# Patient Record
Sex: FEMALE | Race: WHITE | HISPANIC OR LATINO | Employment: PART TIME | URBAN - METROPOLITAN AREA
[De-identification: names, ages, dates, MRNs, and addresses within clinical notes are randomized per-mention and may not be internally consistent; named-entity substitution may affect disease eponyms.]

---

## 2017-02-15 ENCOUNTER — OFFICE VISIT (OUTPATIENT)
Dept: MEDICAL GROUP | Facility: PHYSICIAN GROUP | Age: 22
End: 2017-02-15
Payer: COMMERCIAL

## 2017-02-15 ENCOUNTER — TELEPHONE (OUTPATIENT)
Dept: MEDICAL GROUP | Facility: PHYSICIAN GROUP | Age: 22
End: 2017-02-15

## 2017-02-15 VITALS
TEMPERATURE: 97.5 F | SYSTOLIC BLOOD PRESSURE: 124 MMHG | DIASTOLIC BLOOD PRESSURE: 86 MMHG | RESPIRATION RATE: 18 BRPM | HEIGHT: 64 IN | BODY MASS INDEX: 30.77 KG/M2 | HEART RATE: 80 BPM | OXYGEN SATURATION: 95 % | WEIGHT: 180.2 LBS

## 2017-02-15 DIAGNOSIS — Z30.41 SURVEILLANCE FOR BIRTH CONTROL, ORAL CONTRACEPTIVES: ICD-10-CM

## 2017-02-15 DIAGNOSIS — Z30.41 ENCOUNTER FOR SURVEILLANCE OF CONTRACEPTIVE PILLS: ICD-10-CM

## 2017-02-15 PROCEDURE — 99213 OFFICE O/P EST LOW 20 MIN: CPT | Performed by: NURSE PRACTITIONER

## 2017-02-15 RX ORDER — NORGESTIMATE-ETHINYL ESTRADIOL 7DAYSX3 LO
1 TABLET ORAL DAILY
Qty: 84 TAB | Refills: 3 | Status: SHIPPED | OUTPATIENT
Start: 2017-02-15 | End: 2017-09-29

## 2017-02-15 ASSESSMENT — PATIENT HEALTH QUESTIONNAIRE - PHQ9: CLINICAL INTERPRETATION OF PHQ2 SCORE: 0

## 2017-02-15 NOTE — TELEPHONE ENCOUNTER
Pt seen today  Received fax from pharmacy regarding ortho tri-cyclen lo.  rx that was sent in indicates BRAVO but in notes it states generic requested.  Pharmacy is asking for verification.    [facility]  [phone]

## 2017-02-15 NOTE — PROGRESS NOTES
"Subjective:     Chief Complaint   Patient presents with   • Medication Refill     Birth Control- GENERIC        HPI:  Tabbatha Reyes is a 21 y.o. female here today to discuss the following:    Birth control  Patient here for refill on OCPs. She is currently sexually active and uses no other protection. LMP 2/9/2019 ending yesterday. Menses are regular with moderate to light flow She is nonsmoker and denies any leg pain. No history of blood clots. Blood pressures controlled at 124/86.        Current medicines (including changes today)  Current Outpatient Prescriptions   Medication Sig Dispense Refill   • ORTHO TRI-CYCLEN LO 0.18/0.215/0.25 MG-25 MCG Tab Take 1 Tab by mouth every day. 84 Tab 3     No current facility-administered medications for this visit.       She  has no past medical history on file.    ROS   Review of Systems   Constitutional: Negative for fever, chills, weight loss and malaise/fatigue.   HENT: Negative for ear pain, nosebleeds, congestion, sore throat and neck pain.    Respiratory: Negative for cough, sputum production, shortness of breath and wheezing.    Cardiovascular: Negative for chest pain, palpitations,  and leg swelling.   Gastrointestinal: Negative for heartburn, nausea, vomiting, diarrhea and abdominal pain.   Neurological: Negative for dizziness, tingling, tremors, sensory change, focal weakness and headaches.   Psychiatric/Behavioral: Negative for depression, anxiety, suicidal ideas, insomnia and memory loss.    All other systems reviewed and are negative except as in HPI.     Objective:   Physical Exam:  Blood pressure 124/86, pulse 80, temperature 36.4 °C (97.5 °F), resp. rate 18, height 1.613 m (5' 3.5\"), weight 81.738 kg (180 lb 3.2 oz), last menstrual period 02/14/2017, SpO2 95 %, not currently breastfeeding. Body mass index is 31.42 kg/(m^2).   General:  Well nourished, well developed in NAD  Head is grossly normal.  Neck: Supple without JVD   Pulmonary:  Normal " effort.  Cardiovascular: Regular rate   Extremities: no clubbing, cyanosis, or edema.   Assessment and Plan:   The following treatment plan was discussed   1. Surveillance for birth control, oral contraceptives  ORTHO TRI-CYCLEN LO 0.18/0.215/0.25 MG-25 MCG Tab   2. Encounter for surveillance of contraceptive pills         Followup: Return in about 1 year (around 2/15/2018) for AWV/HRA.   Please note that this dictation was created using voice recognition software. I have made every reasonable attempt to correct obvious errors, but I expect that there are errors of grammar and possibly content that I did not discover before finalizing the note.

## 2017-02-15 NOTE — ASSESSMENT & PLAN NOTE
Patient here for refill on OCPs. She is currently sexually active and uses no other protection. LMP 2/9/2019 ending yesterday. Menses are regular with moderate to light flow She is nonsmoker and denies any leg pain. No history of blood clots. Blood pressures controlled at 124/86.

## 2017-02-15 NOTE — MR AVS SNAPSHOT
" Tabbatha Reyes   2/15/2017 8:00 AM   Office Visit   MRN: [id number]    Department:  [facility]   Dept Phone:  [phone]    Description:  Female : 1995   Provider:  WILLIAM Warren           Reason for Visit     Medication Refill Birth Control- GENERIC       Allergies as of 2/15/2017     No Known Allergies      You were diagnosed with     Surveillance for birth control, oral contraceptives   [[id number]]       Encounter for BCP (birth control pills) initial prescription   [[id number]]         Vital Signs     Blood Pressure Pulse Temperature Respirations Height Weight    124/86 mmHg 80 36.4 °C (97.5 °F) 18 1.613 m (5' 3.5\") 81.738 kg (180 lb 3.2 oz)    Body Mass Index Oxygen Saturation Last Menstrual Period Breastfeeding? Smoking Status       31.42 kg/m2 95% 2017 No Never Smoker        Basic Information     Date Of Birth Sex Race Ethnicity Preferred Language    1995 Female  or   Origin (Urdu,Argentine,Portuguese,Latvian, etc) English      Problem List              ICD-10-CM Priority Class Noted - Resolved    Right knee pain M25.561   10/29/2014 - Present    Encounter for BCP (birth control pills) initial prescription Z30.011   2015 - Present      Health Maintenance        Date Due Completion Dates    IMM HEP B VACCINE (1 of 3 - Primary Series) 1997 ---    IMM HEP A VACCINE (1 of 2 - Standard Series) 1998 ---    IMM VARICELLA (CHICKENPOX) VACCINE (1 of 2 - 2 Dose Adolescent Series) 2010 ---    IMM MENINGOCOCCAL VACCINE (MCV4) (1 of 1) 2013 ---    IMM INFLUENZA (1) 2016 ---    IMM DTaP/Tdap/Td Vaccine (1 - Tdap) 2016 ---            Current Immunizations     HPV 9-VALENT VACCINE (GARDASIL 9) 2016, 2016    HPV Quadrivalent Vaccine (GARDASIL) 2015      Below and/or attached are the medications your provider expects you to take. Review all of your home medications and newly ordered medications with your " provider and/or pharmacist. Follow medication instructions as directed by your provider and/or pharmacist. Please keep your medication list with you and share with your provider. Update the information when medications are discontinued, doses are changed, or new medications (including over-the-counter products) are added; and carry medication information at all times in the event of emergency situations     Allergies:  No Known Allergies          Medications  Valid as of: February 15, 2017 -  8:16 AM    Generic Name Brand Name Tablet Size Instructions for use    Norgestim-Eth Estrad Triphasic (Tab) ORTHO TRI-CYCLEN LO 0.18/0.215/0.25 MG-25 MCG Take 1 Tab by mouth every day.        .                 Medicines prescribed today were sent to:     [facility] [address] [place] [address], [place] - [address]    [address] [place] [address]    Phone: [phone] Fax: [phone]    Open 24 Hours?: No      Medication refill instructions:       If your prescription bottle indicates you have medication refills left, it is not necessary to call your provider’s office. Please contact your pharmacy and they will refill your medication.    If your prescription bottle indicates you do not have any refills left, you may request refills at any time through one of the following ways: The online [employer] system (except Urgent Care), by calling your provider’s office, or by asking your pharmacy to contact your provider’s office with a refill request. Medication refills are processed only during regular business hours and may not be available until the next business day. Your provider may request additional information or to have a follow-up visit with you prior to refilling your medication.   *Please Note: Medication refills are assigned a new Rx number when refilled electronically. Your pharmacy may indicate that no refills were authorized even though a new prescription for the same medication is available at the pharmacy. Please request the  medicine by name with the pharmacy before contacting your provider for a refill.           MyChart Status: Patient Declined

## 2017-09-29 ENCOUNTER — OFFICE VISIT (OUTPATIENT)
Dept: MEDICAL GROUP | Facility: PHYSICIAN GROUP | Age: 22
End: 2017-09-29
Payer: COMMERCIAL

## 2017-09-29 ENCOUNTER — HOSPITAL ENCOUNTER (OUTPATIENT)
Facility: MEDICAL CENTER | Age: 22
End: 2017-09-29
Attending: FAMILY MEDICINE
Payer: COMMERCIAL

## 2017-09-29 VITALS
RESPIRATION RATE: 14 BRPM | HEART RATE: 72 BPM | WEIGHT: 169 LBS | TEMPERATURE: 96.7 F | DIASTOLIC BLOOD PRESSURE: 72 MMHG | OXYGEN SATURATION: 98 % | SYSTOLIC BLOOD PRESSURE: 110 MMHG | HEIGHT: 64 IN | BODY MASS INDEX: 28.85 KG/M2

## 2017-09-29 DIAGNOSIS — Z30.9 ENCOUNTER FOR CONTRACEPTIVE MANAGEMENT, UNSPECIFIED TYPE: ICD-10-CM

## 2017-09-29 DIAGNOSIS — Z11.3 ROUTINE SCREENING FOR STI (SEXUALLY TRANSMITTED INFECTION): ICD-10-CM

## 2017-09-29 PROCEDURE — 87591 N.GONORRHOEAE DNA AMP PROB: CPT

## 2017-09-29 PROCEDURE — 99214 OFFICE O/P EST MOD 30 MIN: CPT | Performed by: FAMILY MEDICINE

## 2017-09-29 PROCEDURE — 87491 CHLMYD TRACH DNA AMP PROBE: CPT

## 2017-09-29 RX ORDER — NORELGESTROMIN AND ETHINYL ESTRADIOL 35; 150 UG/MG; UG/MG
1 PATCH TRANSDERMAL
Qty: 4 PATCH | Refills: 11 | Status: SHIPPED | OUTPATIENT
Start: 2017-09-29 | End: 2018-08-24

## 2017-09-29 NOTE — PROGRESS NOTES
Chief Complaint   Patient presents with   • Contraception       HISTORY OF PRESENT ILLNESS: Patient is a 21 y.o. female established patient here today for the following concerns:    1. Encounter for contraceptive management, unspecified type  Here for follow up.  Has been previously on OCPs, but stopped them.  She is interested in ortho-evra.  Some of her friends are using it and they appreciate the ease of use.  Menses have been regular with LMP last Sunday.      2. Routine screening for STI (sexually transmitted infection)  Due for GC/Chl screening.       Past Medical, Social, and Family history reviewed and updated in EPIC    Allergies:Review of patient's allergies indicates no known allergies.    Current Outpatient Prescriptions   Medication Sig Dispense Refill   • norelgestromin-ethinyl estradiol (ORTHO EVRA) 150-35 MCG/24HR patch Apply 1 Patch to skin as directed every 7 days. 4 Patch 11     No current facility-administered medications for this visit.          ROS:  Review of Systems   Constitutional: Negative for fever, chills, weight loss and malaise/fatigue.   HENT: Negative for ear pain, nosebleeds, congestion, sore throat and neck pain.    Eyes: Negative for blurred vision.   Respiratory: Negative for cough, sputum production, shortness of breath and wheezing.    Cardiovascular: Negative for chest pain, palpitations,  and leg swelling.   Gastrointestinal: Negative for heartburn, nausea, vomiting, diarrhea and abdominal pain.   Genitourinary: Negative for dysuria, urgency and frequency.   Musculoskeletal: Negative for myalgias, back pain and joint pain.   Skin: Negative for rash and itching.   Neurological: Negative for dizziness, tingling, tremors, sensory change, focal weakness and headaches.   Endo/Heme/Allergies: Does not bruise/bleed easily.   Psychiatric/Behavioral: Negative for depression, anxiety, suicidal ideas, insomnia and memory loss.      Exam:  Blood pressure 110/72, pulse 72, temperature  "35.9 °C (96.7 °F), resp. rate 14, height 1.613 m (5' 3.5\"), weight 76.7 kg (169 lb), last menstrual period 09/22/2017, SpO2 98 %.    General:  Well nourished, well developed in NAD  Head is grossly normal.  Neck: Supple without JVD   Pulmonary:  Normal effort.   Cardiovascular: Regular rate  Extremities: no clubbing, cyanosis, or edema.  Psych: affect appropriate      Please note that this dictation was created using voice recognition software. I have made every reasonable attempt to correct obvious errors, but I expect that there are errors of grammar and possibly content that I did not discover before finalizing the note.    Assessment/Plan:  1. Encounter for contraceptive management, unspecified type  - norelgestromin-ethinyl estradiol (ORTHO EVRA) 150-35 MCG/24HR patch; Apply 1 Patch to skin as directed every 7 days.  Dispense: 4 Patch; Refill: 11    2. Routine screening for STI (sexually transmitted infection)  - CHLAMYDIA/GC PCR URINE OR SWAB; Future    Annual follow up        "

## 2017-09-30 DIAGNOSIS — Z11.3 ROUTINE SCREENING FOR STI (SEXUALLY TRANSMITTED INFECTION): ICD-10-CM

## 2017-10-03 LAB
C TRACH DNA SPEC QL NAA+PROBE: POSITIVE
N GONORRHOEA DNA SPEC QL NAA+PROBE: NEGATIVE
SPECIMEN SOURCE: ABNORMAL

## 2017-10-04 ENCOUNTER — TELEPHONE (OUTPATIENT)
Dept: MEDICAL GROUP | Facility: PHYSICIAN GROUP | Age: 22
End: 2017-10-04

## 2017-10-04 DIAGNOSIS — A74.9 CHLAMYDIA: ICD-10-CM

## 2017-10-04 RX ORDER — DOXYCYCLINE HYCLATE 100 MG
100 TABLET ORAL 2 TIMES DAILY
Qty: 20 TAB | Refills: 0 | Status: SHIPPED | OUTPATIENT
Start: 2017-10-04 | End: 2017-10-14

## 2017-10-04 NOTE — TELEPHONE ENCOUNTER
Left a message for the patient to call us back for lab results.  The from is being sent to [facility] to the [facility].

## 2017-10-04 NOTE — TELEPHONE ENCOUNTER
Please call Barrington.  She needs to be treated with doxycycline for 10 days.  We will want a test of cure at 2 weeks with another vaginal swab.  Any sexual partners should also be tested and treated.

## 2017-11-02 ENCOUNTER — HOSPITAL ENCOUNTER (OUTPATIENT)
Facility: MEDICAL CENTER | Age: 22
End: 2017-11-02
Attending: FAMILY MEDICINE
Payer: COMMERCIAL

## 2017-11-02 ENCOUNTER — NON-PROVIDER VISIT (OUTPATIENT)
Dept: MEDICAL GROUP | Facility: PHYSICIAN GROUP | Age: 22
End: 2017-11-02
Payer: COMMERCIAL

## 2017-11-02 DIAGNOSIS — A74.9 CHLAMYDIA: ICD-10-CM

## 2017-11-02 NOTE — PROGRESS NOTES
Tabbatha Reyes is a 21 y.o. female here for a non-provider visit for GC/Chlamydia collection.    If abnormal was an in office provider notified today (if so, indicate provider)? Yes  Routed to PCP? Yes

## 2017-11-03 ENCOUNTER — TELEPHONE (OUTPATIENT)
Dept: MEDICAL GROUP | Facility: PHYSICIAN GROUP | Age: 22
End: 2017-11-03

## 2017-11-03 DIAGNOSIS — A74.9 CHLAMYDIA: ICD-10-CM

## 2017-11-03 RX ORDER — AZITHROMYCIN 250 MG/1
1000 TABLET, FILM COATED ORAL ONCE
Qty: 4 TAB | Refills: 0 | Status: SHIPPED | OUTPATIENT
Start: 2017-11-03 | End: 2017-11-03

## 2017-11-03 NOTE — TELEPHONE ENCOUNTER
----- Message from Mary Lion M.D. sent at 11/3/2017  8:45 AM PDT -----  Barrington is still positive for chlamydia. New Rx will be sent in and she will need to retest again in 2 weeks. All sexual contacts should also be treated.  Please also send report to the [facility] district

## 2017-11-13 ENCOUNTER — NON-PROVIDER VISIT (OUTPATIENT)
Dept: OCCUPATIONAL MEDICINE | Facility: CLINIC | Age: 22
End: 2017-11-13

## 2017-11-13 DIAGNOSIS — Z02.1 PRE-EMPLOYMENT DRUG SCREENING: ICD-10-CM

## 2017-11-13 DIAGNOSIS — Z02.1 DRUG TESTING, PRE-EMPLOYMENT: ICD-10-CM

## 2017-11-13 LAB
AMP AMPHETAMINE: NORMAL
COC COCAINE: NORMAL
INT CON NEG: NORMAL
INT CON POS: NORMAL
MET METHAMPHETAMINES: NORMAL
OPI OPIATES: NORMAL
PCP PHENCYCLIDINE: NORMAL
POC DRUG COMMENT 753798-OCCUPATIONAL HEALTH: NEGATIVE
THC: NORMAL

## 2017-11-13 PROCEDURE — 80305 DRUG TEST PRSMV DIR OPT OBS: CPT | Performed by: PREVENTIVE MEDICINE

## 2018-03-22 ENCOUNTER — PATIENT OUTREACH (OUTPATIENT)
Dept: HEALTH INFORMATION MANAGEMENT | Facility: OTHER | Age: 23
End: 2018-03-22

## 2018-03-22 NOTE — PROGRESS NOTES
Outcome: Left Message to schedule immunizations, PRM project    Please transfer to Patient Outreach Team at [phone] when patient returns call.    WebIZ Checked & Epic Updated:  yes      Attempt # 1

## 2018-03-22 NOTE — PROGRESS NOTES
1. Attempt #: 1    2. HealthConnect Verified: no    3. Verify PCP: yes    5.  Reviewed/Updated the following with patient:       •   Communication Preference Obtained? YES      6. [employer] Activation: sent activation code    7. [employer] Flako: no        9. Care Gap Scheduling (Attempt to Schedule EACH Overdue Care Gap!)     Health Maintenance Due   Topic Date Due   • IMM HEP B VACCINE (1 of 3 - Primary Series) 1995   • IMM HEP A VACCINE (1 of 2 - Standard Series) 12/23/1998   • IMM MENINGOCOCCAL B VACCINE (1 of 3 - [place] 3-Dose Series ) 12/23/2007   • IMM VARICELLA (CHICKENPOX) VACCINE (1 of 2 - 2 Dose Adolescent Series) 12/23/2010   • IMM MENINGOCOCCAL VACCINE (MCV4) (1 of 1) 12/23/2013   • IMM DTaP/Tdap/Td Vaccine (1 - Tdap) 12/23/2016   • IMM INFLUENZA (1) 09/01/2017        Patient has completed pt stated she got the vaccines completed with [facility] a while back .

## 2018-06-15 ENCOUNTER — NON-PROVIDER VISIT (OUTPATIENT)
Dept: OCCUPATIONAL MEDICINE | Facility: CLINIC | Age: 23
End: 2018-06-15

## 2018-06-15 DIAGNOSIS — Z02.1 PRE-EMPLOYMENT DRUG SCREENING: ICD-10-CM

## 2018-06-15 LAB
AMP AMPHETAMINE: NORMAL
COC COCAINE: NORMAL
INT CON NEG: NEGATIVE
INT CON POS: POSITIVE
MET METHAMPHETAMINES: NORMAL
OPI OPIATES: NORMAL
PCP PHENCYCLIDINE: NORMAL
POC DRUG COMMENT 753798-OCCUPATIONAL HEALTH: NEGATIVE
THC: NORMAL

## 2018-06-15 PROCEDURE — 80305 DRUG TEST PRSMV DIR OPT OBS: CPT | Performed by: PREVENTIVE MEDICINE

## 2018-07-26 ENCOUNTER — PATIENT MESSAGE (OUTPATIENT)
Dept: MEDICAL GROUP | Facility: PHYSICIAN GROUP | Age: 23
End: 2018-07-26

## 2018-08-24 ENCOUNTER — OFFICE VISIT (OUTPATIENT)
Dept: MEDICAL GROUP | Facility: PHYSICIAN GROUP | Age: 23
End: 2018-08-24
Payer: COMMERCIAL

## 2018-08-24 VITALS
BODY MASS INDEX: 27.31 KG/M2 | WEIGHT: 160 LBS | RESPIRATION RATE: 14 BRPM | DIASTOLIC BLOOD PRESSURE: 70 MMHG | HEIGHT: 64 IN | SYSTOLIC BLOOD PRESSURE: 116 MMHG | OXYGEN SATURATION: 98 % | TEMPERATURE: 97.7 F | HEART RATE: 78 BPM

## 2018-08-24 DIAGNOSIS — N91.2 AMENORRHEA: ICD-10-CM

## 2018-08-24 DIAGNOSIS — Z32.01 POSITIVE PREGNANCY TEST: ICD-10-CM

## 2018-08-24 LAB
INT CON NEG: NEGATIVE
INT CON POS: POSITIVE
POC URINE PREGNANCY TEST: NORMAL

## 2018-08-24 PROCEDURE — 81025 URINE PREGNANCY TEST: CPT | Performed by: FAMILY MEDICINE

## 2018-08-24 PROCEDURE — 99214 OFFICE O/P EST MOD 30 MIN: CPT | Performed by: FAMILY MEDICINE

## 2018-08-24 ASSESSMENT — PATIENT HEALTH QUESTIONNAIRE - PHQ9: CLINICAL INTERPRETATION OF PHQ2 SCORE: 0

## 2018-08-24 NOTE — PROGRESS NOTES
"Chief Complaint   Patient presents with   • Amenorrhea       HISTORY OF PRESENT ILLNESS: Patient is a 22 y.o. female established patient here today for the following concerns:    1. Amenorrhea  2. Positive pregnancy test  Here today for amenorrhea for the last 3 months, LNMP was .   She reports that she had a positive pregnancy test on .  This pregnancy is welcomed and the father of the baby is involved and excited as well.  Barrington is a 22 year old  who has not had initial pap smear screening.  She has begun prenatal vitamins.        Past Medical, Social, and Family history reviewed and updated in EPIC    Allergies:Patient has no known allergies.    No current outpatient prescriptions on file.     No current facility-administered medications for this visit.          ROS:  Review of Systems   Constitutional: Negative for fever, chills, weight loss and malaise/fatigue.   HENT: Negative for ear pain, nosebleeds, congestion, sore throat and neck pain.    Eyes: Negative for blurred vision.   Respiratory: Negative for cough, sputum production, shortness of breath and wheezing.    Cardiovascular: Negative for chest pain, palpitations,  and leg swelling.   Gastrointestinal: Negative for heartburn, nausea, vomiting, diarrhea and abdominal pain.   Genitourinary: Negative for dysuria, urgency and frequency.   Musculoskeletal: Negative for myalgias, back pain and joint pain.   Skin: Negative for rash and itching.   Neurological: Negative for dizziness, tingling, tremors, sensory change, focal weakness and headaches.   Endo/Heme/Allergies: Does not bruise/bleed easily.   Psychiatric/Behavioral: Negative for depression, anxiety, suicidal ideas, insomnia and memory loss.      Exam:  Blood pressure 116/70, pulse 78, temperature 36.5 °C (97.7 °F), resp. rate 14, height 1.613 m (5' 3.5\"), weight 72.6 kg (160 lb), last menstrual period 2018, SpO2 98 %.    General:  Well nourished, well developed in NAD  Head " is grossly normal.  Neck: Supple without JVD   Pulmonary:  Normal effort.   Cardiovascular: Regular rate  Extremities: no clubbing, cyanosis, or edema.  Psych: affect appropriate      Please note that this dictation was created using voice recognition software. I have made every reasonable attempt to correct obvious errors, but I expect that there are errors of grammar and possibly content that I did not discover before finalizing the note.    Assessment/Plan:  1. Amenorrhea  - POCT Pregnancy  - US-OB 1ST TRIMESTER EA ADD GEST; Future  - REFERRAL TO OB/GYN    2. Positive pregnancy test  - POCT Pregnancy  - US-OB 1ST TRIMESTER EA ADD GEST; Future  - REFERRAL TO OB/GYN  Counseled today on prenatal care, continue PNV, avoid ETOH, smoking, healthy diet and regular low impact exercise.

## 2018-08-31 ENCOUNTER — APPOINTMENT (OUTPATIENT)
Dept: RADIOLOGY | Facility: MEDICAL CENTER | Age: 23
End: 2018-08-31
Attending: FAMILY MEDICINE
Payer: COMMERCIAL

## 2018-09-07 ENCOUNTER — HOSPITAL ENCOUNTER (OUTPATIENT)
Dept: RADIOLOGY | Facility: MEDICAL CENTER | Age: 23
End: 2018-09-07
Attending: FAMILY MEDICINE
Payer: COMMERCIAL

## 2018-09-07 DIAGNOSIS — Z32.01 POSITIVE PREGNANCY TEST: ICD-10-CM

## 2018-09-07 DIAGNOSIS — N91.2 AMENORRHEA: ICD-10-CM

## 2018-09-07 PROCEDURE — 76801 OB US < 14 WKS SINGLE FETUS: CPT

## 2018-09-11 DIAGNOSIS — Z3A.20 20 WEEKS GESTATION OF PREGNANCY: ICD-10-CM

## 2018-09-18 ENCOUNTER — PATIENT MESSAGE (OUTPATIENT)
Dept: MEDICAL GROUP | Facility: PHYSICIAN GROUP | Age: 23
End: 2018-09-18

## 2018-09-18 DIAGNOSIS — Z34.92 NORMAL INTRAUTERINE PREGNANCY ON PRENATAL ULTRASOUND IN SECOND TRIMESTER: ICD-10-CM

## 2018-10-08 ENCOUNTER — HOSPITAL ENCOUNTER (OUTPATIENT)
Dept: LAB | Facility: MEDICAL CENTER | Age: 23
End: 2018-10-08
Attending: FAMILY MEDICINE
Payer: COMMERCIAL

## 2018-10-08 DIAGNOSIS — Z34.92 NORMAL INTRAUTERINE PREGNANCY ON PRENATAL ULTRASOUND IN SECOND TRIMESTER: ICD-10-CM

## 2018-10-08 LAB
ABO GROUP BLD: NORMAL
APPEARANCE UR: ABNORMAL
BACTERIA #/AREA URNS HPF: ABNORMAL /HPF
BASOPHILS # BLD AUTO: 0.3 % (ref 0–1.8)
BASOPHILS # BLD: 0.03 K/UL (ref 0–0.12)
BILIRUB UR QL STRIP.AUTO: NEGATIVE
BLD GP AB SCN SERPL QL: NORMAL
COLOR UR: YELLOW
EOSINOPHIL # BLD AUTO: 0.05 K/UL (ref 0–0.51)
EOSINOPHIL NFR BLD: 0.6 % (ref 0–6.9)
EPI CELLS #/AREA URNS HPF: ABNORMAL /HPF
ERYTHROCYTE [DISTWIDTH] IN BLOOD BY AUTOMATED COUNT: 43.1 FL (ref 35.9–50)
GLUCOSE UR STRIP.AUTO-MCNC: NEGATIVE MG/DL
HBV SURFACE AG SER QL: NEGATIVE
HCT VFR BLD AUTO: 39.7 % (ref 37–47)
HCV AB SER QL: NEGATIVE
HGB BLD-MCNC: 12.9 G/DL (ref 12–16)
HIV 1+2 AB+HIV1 P24 AG SERPL QL IA: NON REACTIVE
HYALINE CASTS #/AREA URNS LPF: ABNORMAL /LPF
IMM GRANULOCYTES # BLD AUTO: 0.03 K/UL (ref 0–0.11)
IMM GRANULOCYTES NFR BLD AUTO: 0.3 % (ref 0–0.9)
KETONES UR STRIP.AUTO-MCNC: NEGATIVE MG/DL
LEUKOCYTE ESTERASE UR QL STRIP.AUTO: ABNORMAL
LYMPHOCYTES # BLD AUTO: 1.78 K/UL (ref 1–4.8)
LYMPHOCYTES NFR BLD: 20.4 % (ref 22–41)
MCH RBC QN AUTO: 30.3 PG (ref 27–33)
MCHC RBC AUTO-ENTMCNC: 32.5 G/DL (ref 33.6–35)
MCV RBC AUTO: 93.2 FL (ref 81.4–97.8)
MICRO URNS: ABNORMAL
MONOCYTES # BLD AUTO: 0.3 K/UL (ref 0–0.85)
MONOCYTES NFR BLD AUTO: 3.4 % (ref 0–13.4)
NEUTROPHILS # BLD AUTO: 6.53 K/UL (ref 2–7.15)
NEUTROPHILS NFR BLD: 75 % (ref 44–72)
NITRITE UR QL STRIP.AUTO: NEGATIVE
NRBC # BLD AUTO: 0 K/UL
NRBC BLD-RTO: 0 /100 WBC
PH UR STRIP.AUTO: 7 [PH]
PLATELET # BLD AUTO: 266 K/UL (ref 164–446)
PMV BLD AUTO: 9.2 FL (ref 9–12.9)
PROT UR QL STRIP: NEGATIVE MG/DL
RBC # BLD AUTO: 4.26 M/UL (ref 4.2–5.4)
RBC # URNS HPF: ABNORMAL /HPF
RBC UR QL AUTO: NEGATIVE
RH BLD: NORMAL
RUBV AB SER QL: 246.8 IU/ML
SP GR UR STRIP.AUTO: 1.02
TREPONEMA PALLIDUM IGG+IGM AB [PRESENCE] IN SERUM OR PLASMA BY IMMUNOASSAY: NON REACTIVE
UROBILINOGEN UR STRIP.AUTO-MCNC: 0.2 MG/DL
WBC # BLD AUTO: 8.7 K/UL (ref 4.8–10.8)
WBC #/AREA URNS HPF: ABNORMAL /HPF

## 2018-10-08 PROCEDURE — 87086 URINE CULTURE/COLONY COUNT: CPT

## 2018-10-08 PROCEDURE — 86762 RUBELLA ANTIBODY: CPT

## 2018-10-08 PROCEDURE — 86803 HEPATITIS C AB TEST: CPT

## 2018-10-08 PROCEDURE — 86850 RBC ANTIBODY SCREEN: CPT

## 2018-10-08 PROCEDURE — 87340 HEPATITIS B SURFACE AG IA: CPT

## 2018-10-08 PROCEDURE — 86901 BLOOD TYPING SEROLOGIC RH(D): CPT

## 2018-10-08 PROCEDURE — 36415 COLL VENOUS BLD VENIPUNCTURE: CPT

## 2018-10-08 PROCEDURE — 85025 COMPLETE CBC W/AUTO DIFF WBC: CPT

## 2018-10-08 PROCEDURE — 87389 HIV-1 AG W/HIV-1&-2 AB AG IA: CPT

## 2018-10-08 PROCEDURE — 81001 URINALYSIS AUTO W/SCOPE: CPT

## 2018-10-08 PROCEDURE — 86900 BLOOD TYPING SEROLOGIC ABO: CPT

## 2018-10-08 PROCEDURE — 86780 TREPONEMA PALLIDUM: CPT

## 2018-10-10 LAB
BACTERIA UR CULT: NORMAL
SIGNIFICANT IND 70042: NORMAL
SITE SITE: NORMAL
SOURCE SOURCE: NORMAL

## 2018-10-23 ENCOUNTER — PATIENT MESSAGE (OUTPATIENT)
Dept: MEDICAL GROUP | Facility: PHYSICIAN GROUP | Age: 23
End: 2018-10-23

## 2018-10-23 DIAGNOSIS — Z20.2 CHLAMYDIA CONTACT: ICD-10-CM

## 2018-10-29 ENCOUNTER — HOSPITAL ENCOUNTER (OUTPATIENT)
Dept: LAB | Facility: MEDICAL CENTER | Age: 23
End: 2018-10-29
Attending: FAMILY MEDICINE
Payer: COMMERCIAL

## 2018-10-29 DIAGNOSIS — Z20.2 CHLAMYDIA CONTACT: ICD-10-CM

## 2018-10-29 LAB
C TRACH DNA SPEC QL NAA+PROBE: NEGATIVE
N GONORRHOEA DNA SPEC QL NAA+PROBE: NEGATIVE
SPECIMEN SOURCE: NORMAL

## 2018-10-29 PROCEDURE — 87591 N.GONORRHOEAE DNA AMP PROB: CPT

## 2018-10-29 PROCEDURE — 87491 CHLMYD TRACH DNA AMP PROBE: CPT

## 2018-11-21 ENCOUNTER — HOSPITAL ENCOUNTER (OUTPATIENT)
Dept: LAB | Facility: MEDICAL CENTER | Age: 23
End: 2018-11-21
Attending: OBSTETRICS & GYNECOLOGY
Payer: COMMERCIAL

## 2018-11-21 LAB
ABO GROUP BLD: NORMAL
BASOPHILS # BLD AUTO: 0.4 % (ref 0–1.8)
BASOPHILS # BLD: 0.04 K/UL (ref 0–0.12)
EOSINOPHIL # BLD AUTO: 0.06 K/UL (ref 0–0.51)
EOSINOPHIL NFR BLD: 0.5 % (ref 0–6.9)
ERYTHROCYTE [DISTWIDTH] IN BLOOD BY AUTOMATED COUNT: 40.8 FL (ref 35.9–50)
GLUCOSE 1H P 50 G GLC PO SERPL-MCNC: 99 MG/DL (ref 70–139)
HCT VFR BLD AUTO: 40.4 % (ref 37–47)
HGB BLD-MCNC: 13.6 G/DL (ref 12–16)
IMM GRANULOCYTES # BLD AUTO: 0.04 K/UL (ref 0–0.11)
IMM GRANULOCYTES NFR BLD AUTO: 0.4 % (ref 0–0.9)
LYMPHOCYTES # BLD AUTO: 1.73 K/UL (ref 1–4.8)
LYMPHOCYTES NFR BLD: 15.6 % (ref 22–41)
MCH RBC QN AUTO: 30.8 PG (ref 27–33)
MCHC RBC AUTO-ENTMCNC: 33.7 G/DL (ref 33.6–35)
MCV RBC AUTO: 91.6 FL (ref 81.4–97.8)
MONOCYTES # BLD AUTO: 0.45 K/UL (ref 0–0.85)
MONOCYTES NFR BLD AUTO: 4.1 % (ref 0–13.4)
NEUTROPHILS # BLD AUTO: 8.74 K/UL (ref 2–7.15)
NEUTROPHILS NFR BLD: 79 % (ref 44–72)
NRBC # BLD AUTO: 0 K/UL
NRBC BLD-RTO: 0 /100 WBC
PLATELET # BLD AUTO: 250 K/UL (ref 164–446)
PMV BLD AUTO: 9.4 FL (ref 9–12.9)
RBC # BLD AUTO: 4.41 M/UL (ref 4.2–5.4)
RH BLD: NORMAL
TREPONEMA PALLIDUM IGG+IGM AB [PRESENCE] IN SERUM OR PLASMA BY IMMUNOASSAY: NON REACTIVE
WBC # BLD AUTO: 11.1 K/UL (ref 4.8–10.8)

## 2018-11-21 PROCEDURE — 86900 BLOOD TYPING SEROLOGIC ABO: CPT

## 2018-11-21 PROCEDURE — 86901 BLOOD TYPING SEROLOGIC RH(D): CPT

## 2018-11-21 PROCEDURE — 85025 COMPLETE CBC W/AUTO DIFF WBC: CPT

## 2018-11-21 PROCEDURE — 36415 COLL VENOUS BLD VENIPUNCTURE: CPT

## 2018-11-21 PROCEDURE — 86780 TREPONEMA PALLIDUM: CPT

## 2018-11-21 PROCEDURE — 82950 GLUCOSE TEST: CPT

## 2019-02-02 ENCOUNTER — HOSPITAL ENCOUNTER (INPATIENT)
Facility: MEDICAL CENTER | Age: 24
LOS: 2 days | End: 2019-02-04
Attending: OBSTETRICS & GYNECOLOGY | Admitting: OBSTETRICS & GYNECOLOGY
Payer: COMMERCIAL

## 2019-02-02 LAB
BASOPHILS # BLD AUTO: 0.4 % (ref 0–1.8)
BASOPHILS # BLD: 0.04 K/UL (ref 0–0.12)
EOSINOPHIL # BLD AUTO: 0.06 K/UL (ref 0–0.51)
EOSINOPHIL NFR BLD: 0.6 % (ref 0–6.9)
ERYTHROCYTE [DISTWIDTH] IN BLOOD BY AUTOMATED COUNT: 39.5 FL (ref 35.9–50)
HCT VFR BLD AUTO: 40.1 % (ref 37–47)
HGB BLD-MCNC: 13.9 G/DL (ref 12–16)
HOLDING TUBE BB 8507: NORMAL
IMM GRANULOCYTES # BLD AUTO: 0.04 K/UL (ref 0–0.11)
IMM GRANULOCYTES NFR BLD AUTO: 0.4 % (ref 0–0.9)
LYMPHOCYTES # BLD AUTO: 2.41 K/UL (ref 1–4.8)
LYMPHOCYTES NFR BLD: 24.5 % (ref 22–41)
MCH RBC QN AUTO: 30.5 PG (ref 27–33)
MCHC RBC AUTO-ENTMCNC: 34.7 G/DL (ref 33.6–35)
MCV RBC AUTO: 87.9 FL (ref 81.4–97.8)
MONOCYTES # BLD AUTO: 0.35 K/UL (ref 0–0.85)
MONOCYTES NFR BLD AUTO: 3.6 % (ref 0–13.4)
NEUTROPHILS # BLD AUTO: 6.93 K/UL (ref 2–7.15)
NEUTROPHILS NFR BLD: 70.5 % (ref 44–72)
NRBC # BLD AUTO: 0 K/UL
NRBC BLD-RTO: 0 /100 WBC
PLATELET # BLD AUTO: 272 K/UL (ref 164–446)
PMV BLD AUTO: 9.1 FL (ref 9–12.9)
RBC # BLD AUTO: 4.56 M/UL (ref 4.2–5.4)
WBC # BLD AUTO: 9.8 K/UL (ref 4.8–10.8)

## 2019-02-02 PROCEDURE — 59409 OBSTETRICAL CARE: CPT

## 2019-02-02 PROCEDURE — 700111 HCHG RX REV CODE 636 W/ 250 OVERRIDE (IP): Performed by: OBSTETRICS & GYNECOLOGY

## 2019-02-02 PROCEDURE — 700111 HCHG RX REV CODE 636 W/ 250 OVERRIDE (IP)

## 2019-02-02 PROCEDURE — 700102 HCHG RX REV CODE 250 W/ 637 OVERRIDE(OP): Performed by: OBSTETRICS & GYNECOLOGY

## 2019-02-02 PROCEDURE — 85025 COMPLETE CBC W/AUTO DIFF WBC: CPT

## 2019-02-02 PROCEDURE — 303615 HCHG EPIDURAL/SPINAL ANESTHESIA FOR LABOR

## 2019-02-02 PROCEDURE — 304965 HCHG RECOVERY SERVICES

## 2019-02-02 PROCEDURE — A9270 NON-COVERED ITEM OR SERVICE: HCPCS | Performed by: OBSTETRICS & GYNECOLOGY

## 2019-02-02 PROCEDURE — 770002 HCHG ROOM/CARE - OB PRIVATE (112)

## 2019-02-02 PROCEDURE — 700105 HCHG RX REV CODE 258: Performed by: OBSTETRICS & GYNECOLOGY

## 2019-02-02 PROCEDURE — 36415 COLL VENOUS BLD VENIPUNCTURE: CPT

## 2019-02-02 RX ORDER — MISOPROSTOL 200 UG/1
800 TABLET ORAL
Status: COMPLETED | OUTPATIENT
Start: 2019-02-02 | End: 2019-02-02

## 2019-02-02 RX ORDER — OXYCODONE HYDROCHLORIDE AND ACETAMINOPHEN 5; 325 MG/1; MG/1
1-2 TABLET ORAL EVERY 4 HOURS PRN
Status: DISCONTINUED | OUTPATIENT
Start: 2019-02-02 | End: 2019-02-04 | Stop reason: HOSPADM

## 2019-02-02 RX ORDER — SODIUM CHLORIDE, SODIUM LACTATE, POTASSIUM CHLORIDE, CALCIUM CHLORIDE 600; 310; 30; 20 MG/100ML; MG/100ML; MG/100ML; MG/100ML
INJECTION, SOLUTION INTRAVENOUS PRN
Status: DISCONTINUED | OUTPATIENT
Start: 2019-02-02 | End: 2019-02-04 | Stop reason: HOSPADM

## 2019-02-02 RX ORDER — CARBOPROST TROMETHAMINE 250 UG/ML
250 INJECTION, SOLUTION INTRAMUSCULAR
Status: DISCONTINUED | OUTPATIENT
Start: 2019-02-02 | End: 2019-02-04 | Stop reason: HOSPADM

## 2019-02-02 RX ORDER — ROPIVACAINE HYDROCHLORIDE 2 MG/ML
INJECTION, SOLUTION EPIDURAL; INFILTRATION; PERINEURAL
Status: COMPLETED
Start: 2019-02-02 | End: 2019-02-02

## 2019-02-02 RX ORDER — DOCUSATE SODIUM 100 MG/1
100 CAPSULE, LIQUID FILLED ORAL 2 TIMES DAILY PRN
Status: DISCONTINUED | OUTPATIENT
Start: 2019-02-02 | End: 2019-02-04 | Stop reason: HOSPADM

## 2019-02-02 RX ORDER — METHYLERGONOVINE MALEATE 0.2 MG/ML
0.2 INJECTION INTRAVENOUS
Status: DISCONTINUED | OUTPATIENT
Start: 2019-02-02 | End: 2019-02-04 | Stop reason: HOSPADM

## 2019-02-02 RX ORDER — BUPIVACAINE HYDROCHLORIDE 2.5 MG/ML
INJECTION, SOLUTION EPIDURAL; INFILTRATION; INTRACAUDAL
Status: DISCONTINUED
Start: 2019-02-02 | End: 2019-02-02 | Stop reason: HOSPADM

## 2019-02-02 RX ORDER — MISOPROSTOL 200 UG/1
600 TABLET ORAL
Status: DISCONTINUED | OUTPATIENT
Start: 2019-02-02 | End: 2019-02-04 | Stop reason: HOSPADM

## 2019-02-02 RX ORDER — HYDROCODONE BITARTRATE AND ACETAMINOPHEN 5; 325 MG/1; MG/1
1 TABLET ORAL EVERY 4 HOURS PRN
Status: DISCONTINUED | OUTPATIENT
Start: 2019-02-02 | End: 2019-02-04 | Stop reason: HOSPADM

## 2019-02-02 RX ORDER — IBUPROFEN 600 MG/1
600 TABLET ORAL EVERY 6 HOURS PRN
Status: DISCONTINUED | OUTPATIENT
Start: 2019-02-02 | End: 2019-02-04 | Stop reason: HOSPADM

## 2019-02-02 RX ORDER — HYDROCODONE BITARTRATE AND ACETAMINOPHEN 10; 325 MG/1; MG/1
1 TABLET ORAL EVERY 4 HOURS PRN
Status: DISCONTINUED | OUTPATIENT
Start: 2019-02-02 | End: 2019-02-04 | Stop reason: HOSPADM

## 2019-02-02 RX ORDER — SODIUM CHLORIDE, SODIUM LACTATE, POTASSIUM CHLORIDE, CALCIUM CHLORIDE 600; 310; 30; 20 MG/100ML; MG/100ML; MG/100ML; MG/100ML
INJECTION, SOLUTION INTRAVENOUS CONTINUOUS
Status: DISCONTINUED | OUTPATIENT
Start: 2019-02-02 | End: 2019-02-02 | Stop reason: HOSPADM

## 2019-02-02 RX ORDER — DEXTROSE, SODIUM CHLORIDE, SODIUM LACTATE, POTASSIUM CHLORIDE, AND CALCIUM CHLORIDE 5; .6; .31; .03; .02 G/100ML; G/100ML; G/100ML; G/100ML; G/100ML
INJECTION, SOLUTION INTRAVENOUS CONTINUOUS
Status: DISCONTINUED | OUTPATIENT
Start: 2019-02-02 | End: 2019-02-02 | Stop reason: HOSPADM

## 2019-02-02 RX ORDER — VITAMIN A ACETATE, BETA CAROTENE, ASCORBIC ACID, CHOLECALCIFEROL, .ALPHA.-TOCOPHEROL ACETATE, DL-, THIAMINE MONONITRATE, RIBOFLAVIN, NIACINAMIDE, PYRIDOXINE HYDROCHLORIDE, FOLIC ACID, CYANOCOBALAMIN, CALCIUM CARBONATE, FERROUS FUMARATE, ZINC OXIDE, CUPRIC OXIDE 3080; 12; 120; 400; 1; 1.84; 3; 20; 22; 920; 25; 200; 27; 10; 2 [IU]/1; UG/1; MG/1; [IU]/1; MG/1; MG/1; MG/1; MG/1; MG/1; [IU]/1; MG/1; MG/1; MG/1; MG/1; MG/1
1 TABLET, FILM COATED ORAL EVERY MORNING
Status: DISCONTINUED | OUTPATIENT
Start: 2019-02-03 | End: 2019-02-04 | Stop reason: HOSPADM

## 2019-02-02 RX ADMIN — SODIUM CHLORIDE, POTASSIUM CHLORIDE, SODIUM LACTATE AND CALCIUM CHLORIDE: 600; 310; 30; 20 INJECTION, SOLUTION INTRAVENOUS at 13:32

## 2019-02-02 RX ADMIN — Medication 2000 ML/HR: at 16:28

## 2019-02-02 RX ADMIN — Medication 125 ML/HR: at 18:08

## 2019-02-02 RX ADMIN — AMPICILLIN SODIUM 1 G: 1 INJECTION, POWDER, FOR SOLUTION INTRAMUSCULAR; INTRAVENOUS at 14:58

## 2019-02-02 RX ADMIN — SODIUM CHLORIDE, POTASSIUM CHLORIDE, SODIUM LACTATE AND CALCIUM CHLORIDE: 600; 310; 30; 20 INJECTION, SOLUTION INTRAVENOUS at 15:02

## 2019-02-02 RX ADMIN — MISOPROSTOL 800 MCG: 200 TABLET ORAL at 16:29

## 2019-02-02 RX ADMIN — ROPIVACAINE HYDROCHLORIDE 100 ML: 2 INJECTION, SOLUTION EPIDURAL; INFILTRATION at 13:46

## 2019-02-02 RX ADMIN — SODIUM CHLORIDE, POTASSIUM CHLORIDE, SODIUM LACTATE AND CALCIUM CHLORIDE: 600; 310; 30; 20 INJECTION, SOLUTION INTRAVENOUS at 10:40

## 2019-02-02 RX ADMIN — AMPICILLIN SODIUM 2 G: 2 INJECTION, POWDER, FOR SOLUTION INTRAMUSCULAR; INTRAVENOUS at 10:39

## 2019-02-02 ASSESSMENT — PATIENT HEALTH QUESTIONNAIRE - PHQ9
1. LITTLE INTEREST OR PLEASURE IN DOING THINGS: NOT AT ALL
2. FEELING DOWN, DEPRESSED, IRRITABLE, OR HOPELESS: NOT AT ALL
SUM OF ALL RESPONSES TO PHQ9 QUESTIONS 1 AND 2: 0

## 2019-02-02 ASSESSMENT — COPD QUESTIONNAIRES
DURING THE PAST 4 WEEKS HOW MUCH DID YOU FEEL SHORT OF BREATH: NONE/LITTLE OF THE TIME
COPD SCREENING SCORE: 0
IN THE PAST 12 MONTHS DO YOU DO LESS THAN YOU USED TO BECAUSE OF YOUR BREATHING PROBLEMS: DISAGREE/UNSURE
HAVE YOU SMOKED AT LEAST 100 CIGARETTES IN YOUR ENTIRE LIFE: NO/DON'T KNOW
DO YOU EVER COUGH UP ANY MUCUS OR PHLEGM?: NO/ONLY WITH OCCASIONAL COLDS OR INFECTIONS

## 2019-02-02 ASSESSMENT — LIFESTYLE VARIABLES
EVER_SMOKED: NEVER
ALCOHOL_USE: NO

## 2019-02-02 NOTE — H&P
History and physical     2019    Chief complaint:   Patient presents with spontaneous rupture of membranes    History of present illness: Patient is a 23-year-old female  1 para 0's estimated due date is  making her 39-1/7 weeks.  She had spontaneous rupture of membranes at about 8:00 this morning.  Currently she is alis mildly every 3-4 minutes.    Medical history: Asthma as a child, she is on no medication    Surgical history: [place] teeth    Habits: Patient denies tobacco alcohol use, she has used marijuana during the pregnancy  Allergies: No known drug allergies    Medications: None    Obstetric history: Patient is Rh+, rubella immune, group B strep positive  Family history: NoncontributoryTo current problem    Physical exam  Vitals: Normal  General: Patient is awake alert pleasant  Head: Atraumatic normocephalic  Abdomen gravid: Estimated fetal weight is 7-1/2-8 pounds  Pelvic: Cervix is 3-4 cm, 89% effaced and vertex per RN  Extremities: Normal    Assessment:  1-term intrauterine pregnancy  2-spontaneous rupture of membranes  3-group B strep positive      Plan:  Patient be managed expectantly.  We discussed the option of Pitocin.  Patient has been begun on ampicillin.

## 2019-02-02 NOTE — PROGRESS NOTES
0920: Assumed care of pt. AAO, Pt ambulated to room, POC discussed, pt and FOB verbalized understanding.  1620: , viable male per Dr. Lopez, baby assigned 8/9 APGARS, baby skin to skin with pt.  : Pt ambulated, voided, pericare complete, pt tolerated well. Pt and baby transferred to postpartum via wheelchair, report given to Evangelina HERNANDEZ, pt in stable condition with a firm fundus and light lochia, ID bands verified.

## 2019-02-02 NOTE — CARE PLAN
Problem: Infection  Goal: Will remain free from infection    Intervention: Assess signs and symptoms of infection  Pt is free from s/s of infection  Intervention: Implement standard precautions and perform hand washing before and after patient contact  Hand hygiene performed before and after pt contact      Problem: Pain  Goal: Alleviation of Pain or a reduction in pain to the patient's comfort goal  Outcome: PROGRESSING AS EXPECTED  Pt is free from pain with an epidural in place  Intervention: Pain Management - Epidural/Spinal  Pt has an epidural in place  Intervention: Pain Management--Medications  Pt educated on medications available for pain

## 2019-02-03 LAB
ERYTHROCYTE [DISTWIDTH] IN BLOOD BY AUTOMATED COUNT: 38.9 FL (ref 35.9–50)
HCT VFR BLD AUTO: 34.5 % (ref 37–47)
HGB BLD-MCNC: 11.9 G/DL (ref 12–16)
MCH RBC QN AUTO: 30.6 PG (ref 27–33)
MCHC RBC AUTO-ENTMCNC: 34.5 G/DL (ref 33.6–35)
MCV RBC AUTO: 88.7 FL (ref 81.4–97.8)
PLATELET # BLD AUTO: 216 K/UL (ref 164–446)
PMV BLD AUTO: 9.2 FL (ref 9–12.9)
RBC # BLD AUTO: 3.89 M/UL (ref 4.2–5.4)
WBC # BLD AUTO: 12 K/UL (ref 4.8–10.8)

## 2019-02-03 PROCEDURE — 36415 COLL VENOUS BLD VENIPUNCTURE: CPT

## 2019-02-03 PROCEDURE — 770002 HCHG ROOM/CARE - OB PRIVATE (112)

## 2019-02-03 PROCEDURE — 85027 COMPLETE CBC AUTOMATED: CPT

## 2019-02-03 PROCEDURE — A9270 NON-COVERED ITEM OR SERVICE: HCPCS | Performed by: OBSTETRICS & GYNECOLOGY

## 2019-02-03 PROCEDURE — 700102 HCHG RX REV CODE 250 W/ 637 OVERRIDE(OP): Performed by: OBSTETRICS & GYNECOLOGY

## 2019-02-03 RX ADMIN — IBUPROFEN 600 MG: 600 TABLET ORAL at 01:48

## 2019-02-03 RX ADMIN — IBUPROFEN 600 MG: 600 TABLET ORAL at 22:52

## 2019-02-03 RX ADMIN — Medication 1 TABLET: at 07:53

## 2019-02-03 ASSESSMENT — EDINBURGH POSTNATAL DEPRESSION SCALE (EPDS)
I HAVE BEEN SO UNHAPPY THAT I HAVE BEEN CRYING: NO, NEVER
I HAVE LOOKED FORWARD WITH ENJOYMENT TO THINGS: AS MUCH AS I EVER DID
THE THOUGHT OF HARMING MYSELF HAS OCCURRED TO ME: NEVER
I HAVE BLAMED MYSELF UNNECESSARILY WHEN THINGS WENT WRONG: NOT VERY OFTEN
I HAVE FELT SCARED OR PANICKY FOR NO GOOD REASON: NO, NOT MUCH
THINGS HAVE BEEN GETTING ON TOP OF ME: NO, MOST OF THE TIME I HAVE COPED QUITE WELL
I HAVE FELT SAD OR MISERABLE: NO, NOT AT ALL
I HAVE BEEN ANXIOUS OR WORRIED FOR NO GOOD REASON: HARDLY EVER
I HAVE BEEN ABLE TO LAUGH AND SEE THE FUNNY SIDE OF THINGS: AS MUCH AS I ALWAYS COULD
I HAVE BEEN SO UNHAPPY THAT I HAVE HAD DIFFICULTY SLEEPING: NOT AT ALL

## 2019-02-03 NOTE — L&D DELIVERY NOTE
Delivery note    2019    Delivering physician: Kath Lopez MD  Attending physician: Corinne cappurro    Diagnoses:   1-Term intrauterine pregnancy  2-delivery of viable male Apgars 8/9      Procedure:  1-Spontaneous vaginal delivery  1-induction with spontaneous vaginal delivery    Hospital course:    Patient is a 23-year-old female  1 para 0    Patient presented active labor at 39 with spontaneous rupture of membranes.    She received an epidural progress and progressed to completion without complication.      Baby delivered an uncomplicated manner.  Baby was vigorous moving all extremities.  Cord was clamped and cut.  Baby was handed off to mom with nurse present.  Apgars were 8/9.  Placenta delivered spontaneously and intact.  Exam of uterus revealed no retained placenta.    Estimated blood loss was 150 cc.    There were no lacerations.

## 2019-02-03 NOTE — PROGRESS NOTES
Pt arrived via wheelchair with belongings to room S342. Report received from Leticia Ron RN. Pt oriented to room, call light & infant security. Assessment done. Fundus firm, lochia light. Vital signs stable. IV infusing 125 of pitocin. Pt states pain is tolerable, see MAR. Pt aware of dangers related to sleeping with infant, reviewed plan of care with pt & encouraged to call with needs or prior to ambulating due to patient's legs still feeling a little numb. Call light in place. Will continue to monitor.

## 2019-02-03 NOTE — CARE PLAN
Problem: Pain  Goal: Alleviation of Pain or a reduction in pain to the patient's comfort goal  Outcome: PROGRESSING AS EXPECTED  Patient reports minimal pain at this time, declines pain medication.  Will continue to monitor.     Problem: Altered physiologic condition related to immediate post-delivery state and potential for bleeding/hemorrhage  Goal: Patient physiologically stable as evidenced by normal lochia, palpable uterine involution and vital signs within normal limits  Outcome: PROGRESSING AS EXPECTED  Fundus firm at umbilicus, light lochia, VSS, will continue to monitor.

## 2019-02-03 NOTE — CARE PLAN
Problem: Alteration in comfort related to episiotomy, vaginal repair and/or after birth pains  Goal: Patient verbalizes acceptable pain level  Outcome: PROGRESSING AS EXPECTED  Patient states pain is tolerable with pain medication.  Will continue to reassess with rounding and medicate accordingly to 0-10 pain scale.    Problem: Potential anxiety related to difficulty adapting to parental role  Goal: Patient will verbalize and demonstrate effective bonding and parenting behavior  Outcome: PROGRESSING AS EXPECTED  Patient is bonding well with baby, recognizing cues, and responding appropriately.

## 2019-02-03 NOTE — PROGRESS NOTES
Progress Note        Subjective: Patient is doing well lochia is normal.    Objective: Vital signs are normal  General: Patient's awake alert pleasant  Head: Atraumatic normocephalic  Abdomen: Fundus is firm nontender    Assessment:  Postpartum day 1 vaginal delivery    Plan:    DC tomorrow  Routine care    Kath Lopez MD

## 2019-02-03 NOTE — PROGRESS NOTES
Assumed care of patient, report received from MARY Hutchinson.  Assessment complete.  Patient education provided on safe infant sleep, feeding schedule, emergency pull, and use of bulb syringe.  Patient states understanding.  Call light within reach, will continue to monitor.

## 2019-02-04 VITALS
HEIGHT: 63 IN | DIASTOLIC BLOOD PRESSURE: 79 MMHG | RESPIRATION RATE: 18 BRPM | OXYGEN SATURATION: 94 % | BODY MASS INDEX: 33.31 KG/M2 | SYSTOLIC BLOOD PRESSURE: 124 MMHG | WEIGHT: 188 LBS | TEMPERATURE: 97.3 F | HEART RATE: 87 BPM

## 2019-02-04 PROCEDURE — 700102 HCHG RX REV CODE 250 W/ 637 OVERRIDE(OP): Performed by: OBSTETRICS & GYNECOLOGY

## 2019-02-04 PROCEDURE — A9270 NON-COVERED ITEM OR SERVICE: HCPCS | Performed by: OBSTETRICS & GYNECOLOGY

## 2019-02-04 RX ADMIN — Medication 1 TABLET: at 05:21

## 2019-02-04 NOTE — PROGRESS NOTES
0711- Report received from MARY Babb.  Patient sleeping.  FOB at bedside.  7965- Patient assessment done.  Patient stated that she is voiding without difficulty and passing flatus.  Patient denied dizziness and stated that she is walking without difficulty.  Discussed pain management plan and patient prefers to call for pain intervention as needed.  Reviewed plan of care.  Patient verbalized understanding.

## 2019-02-04 NOTE — PROGRESS NOTES
1305- Via MARY Carballo, Discharge instructions given to patient who verbalized understanding and stated she has no questions.

## 2019-02-04 NOTE — CARE PLAN
Problem: Potential anxiety related to difficulty adapting to parental role  Goal: Patient will verbalize and demonstrate effective bonding and parenting behavior  Pt able to care for self and infant.     Problem: Pain Management  Goal: Pain level will decrease to patient's comfort goal  Outcome: PROGRESSING AS EXPECTED  Pt pain at a comfortable level, will continue to monitor and medicate per MAR

## 2019-02-04 NOTE — PROGRESS NOTES
Assumed care of patient, report at bedside from,  Evangelina HERNANDEZ. Assessment completed and WDL. Call light within reach, discussed plan of care, denies pain at the moment. Will continue to monitor.

## 2019-02-04 NOTE — PROGRESS NOTES
Patient stated that she is ready for discharge.  Patient declined offer for wheelchair escort.  Patient discharged to home, no change noted in condition, with infant and FOB.

## 2019-02-04 NOTE — DISCHARGE INSTRUCTIONS
POSTPARTUM DISCHARGE INSTRUCTIONS FOR MOM    YOB: 1995   Age: 23 y.o.               Admit Date: 2/2/2019     Discharge Date: 2/4/2019  Attending Doctor:  Corinne E Capurro, M.D.                  Allergies:  Patient has no known allergies.    Discharged to home by car. Discharged via wheelchair, hospital escort: Yes.  Special equipment needed: Not Applicable  Belongings with: Personal  Be sure to schedule a follow-up appointment with your primary care doctor or any specialists as instructed.     Discharge Plan:   Diet Plan: Discussed  Activity Level: Discussed  Confirmed Follow up Appointment: Patient to Call and Schedule Appointment  Medication Reconciliation Updated: Yes  Influenza Vaccine Indication: Patient Refuses    REASONS TO CALL YOUR OBSTETRICIAN:  1.   Persistent fever or shaking chills (Temperature higher than 100.4)  2.   Heavy bleeding (soaking more than 1 pad per hour); Passing clots  3.   Foul odor from vagina  4.   Mastitis (Breast infection; breast pain, chills, fever, redness)  5.   Urinary pain, burning or frequency  6.   Severe depression longer than 24 hours    HAND WASHING  · Prior to handling the baby.  · Before breastfeeding or bottle feeding baby.  · After using the bathroom or changing the baby's diaper.    VAGINAL CARE  · Nothing inside vagina for 6 weeks: no sexual intercourse, tampons or douching.  · Bleeding may continue for 2-4 weeks.  Amount may vary.    · Call your physician for heavy bleeding which means soaking more than 1 pad per hour    BIRTH CONTROL  · It is possible to become pregnant at any time after delivery and while breastfeeding.  · Plan to discuss a method of birth control with your physician at your follow up visit. visit.    DIET AND ELIMINATION  · Eating more fiber (bran cereal, fruits, and vegetables) and drinking plenty of fluids will help to avoid constipation.  · Urinary frequency after childbirth is normal.    POSTPARTUM BLUES  During the first few  "days after birth, you may experience a sense of the \"blues\" which may include impatience, irritability or even crying.  These feeling come and go quickly.  However, as many as 1 in 10 women experience emotional symptoms known as postpartum depression.  Postpartum depression:  May start as early as the second or third day after delivery or take several weeks or months to develop.  Symptoms of \"blues\" are present, but are more intense:  Crying spells; loss of appetite; feelings of hopelessness or loss of control; fear of touching the baby; over concern or no concern at all about the baby; little or no concern about your own appearance/caring for yourself; and/or inability to sleep or excessive sleeping.  Contact your physician if you are experiencing any of these symptoms.  Crisis Hotline:  · [place] Crisis Hotline:  8-222-BTLDTHZ  Or 1-834.841.2674  · [place] Crisis Hotline:  1-810.846.7069  Or 494-071-7358    PREVENTING SHAKEN BABY:  If you are angry or stressed, PUT THE BABY IN THE CRIB, step away, take some deep breaths, and wait until you are calm to care for the baby.  DO NOT SHAKE THE BABY.  You are not alone, call a supporter for help.  · Crisis Call Center 24/7 crisis line 454-107-9692 or 1-918.264.1997  · You can also text them, text \"ANSWER\" to 404319    QUIT SMOKING/TOBACCO USE:  I understand the use of any tobacco products increases my chance of suffering from future heart disease and could cause other illnesses which may shorten my life. Quitting the use of tobacco products is the single most important thing I can do to improve my health. For further information on smoking / tobacco cessation call a Toll Free Quit Line at 1-589.705.4850 (*[employer] [place]) or 1-915.838.2173 (American Lung Association) or you can access the web based program at www.lungusa.org.  · [place] Tobacco Users Help Line:  (439) 183-3290       Toll Free: 1-369.796.3310  · Quit Tobacco Program [facility] " Services (196)568-7703    DEPRESSION / SUICIDE RISK:  As you are discharged from this [facility] facility, it is important to learn how to keep safe from harming yourself.    Recognize the warning signs:  · Abrupt changes in personality, positive or negative- including increase in energy   · Giving away possessions  · Change in eating patterns- significant weight changes-  positive or negative  · Change in sleeping patterns- unable to sleep or sleeping all the time   · Unwillingness or inability to communicate  · Depression  · Unusual sadness, discouragement and loneliness  · Talk of wanting to die  · Neglect of personal appearance   · Rebelliousness- reckless behavior  · Withdrawal from people/activities they love  · Confusion- inability to concentrate     If you or a loved one observes any of these behaviors or has concerns about self-harm, here's what you can do:  · Talk about it- your feelings and reasons for harming yourself  · Remove any means that you might use to hurt yourself (examples: pills, rope, extension cords, firearm)  · Get professional help from the community (Mental Health, Substance Abuse, psychological counseling)  · Do not be alone:Call your Safe Contact- someone whom you trust who will be there for you.  · Call your local CRISIS HOTLINE 028-4246 or 465-193-0601  · Call your local Children's Mobile Crisis Response Team [place] (589) 711-3228 or www.People Pattern  · Call the toll free National Suicide Prevention Hotlines   · National Suicide Prevention Lifeline 509-331-JOJF (2948)  · National Hope Line Network 800-SUICIDE (208-8789)    DISCHARGE SURVEY:  Thank you for choosing [facility].  We hope we provided you with very good care.  You may be receiving a survey in the mail.  Please fill it out.  Your opinion is valuable to us.    My signature on this form indicates that:  1.  I have reviewed and understand the above information  2.  My questions regarding this information have  been answered to my satisfaction.  3.  I have formulated a plan with my discharge nurse to obtain my prescribed medication for home.

## 2019-02-04 NOTE — DISCHARGE SUMMARY
Discharge summary    Date of admission: 2019  Date of discharge: 2019    Discharge diagnoses:  1-term intrauterine pregnancy   2-delivery of viable male Apgars 8/9    Procedures:  1- spontaneous vaginal delivery        Hospital course:    Patient a 23-year-old female  1 para 0 presented at 39 1/7 weeks.    Patient had an uncomplicated vaginal delivery.    Her postpartum course was benign with normal lochia.  Patient was subsequently discharged home      Progress Note    Subjective: Patient is doing well lochia is normal.  Has normal GI and  function.     Objective: Vital signs are normal  General: Patient's awake alert pleasant  Head: Atraumatic normocephalic  Abdomen: Fundus is firm, appropriate tenderness       Assessment:  Term intrauterine pregnancy  Postpartum day 1 status post vaginal delivery  Postpartum day 2 after vaginal delivery    Plan:  DC home  F/u in 4-6 weeks  Activity infectious precautions reviewed    Discharge medications:  None

## 2019-02-04 NOTE — DISCHARGE PLANNING
Discharge Planning Assessment Post Partum    Reason for Referral: History of THC.  Address: [address] [place] [address] [address] [facility] [place] [address]  Phone: [phone]  Type of Living Situation:living with FOB  Mom Diagnosis: Pregnancy  Baby Diagnosis:   Primary Language: English    Name of Baby: Kevin Duran (: 19)  Father of the Baby: Kareem Duran (96)  Involved in baby’s care? Yes  Contact Information: [phone]    Prenatal Care: Yes  Mom's PCP: Dr. Mary Lion  PCP for new baby: Pediatrician list provided    Support System: FOB and MOB's parents  Coping/Bonding between mother & baby: Yes  Source of Feeding: breast feeding  Supplies for Infant: prepared for infant; denies any needs    Mom's Insurance: [employer] and Medicaid  Baby Covered on Insurance:Yes, Medicaid  Mother Employed/School: [employer]  Other children in the home/names & ages: 1st baby    Financial Hardship/Income: denies   Mom's Mental status: alert and oriented  Services used prior to admit: Medicaid and WIC    CPS History: denies  Psychiatric History: denies  Domestic Violence History: denies  Drug/ETOH History: History of marijuana use.  Infant's UDS is negative.    Resources Provided: pediatrician list, children and family resource list, counseling resources for post partum depression  Referrals Made: diaper bank referral provided     Clearance for Discharge: Infant is cleared to discharge home with MOB.

## 2019-02-27 NOTE — ADDENDUM NOTE
Encounter addended by: Kath Lopez M.D. on: 2/27/2019 10:19 AM<BR>    Actions taken: Sign clinical note

## 2020-08-31 ENCOUNTER — HOSPITAL ENCOUNTER (EMERGENCY)
Facility: MEDICAL CENTER | Age: 25
End: 2020-08-31
Attending: EMERGENCY MEDICINE
Payer: COMMERCIAL

## 2020-08-31 VITALS
BODY MASS INDEX: 25.56 KG/M2 | TEMPERATURE: 97.9 F | DIASTOLIC BLOOD PRESSURE: 69 MMHG | HEART RATE: 60 BPM | WEIGHT: 149.69 LBS | RESPIRATION RATE: 16 BRPM | HEIGHT: 64 IN | SYSTOLIC BLOOD PRESSURE: 114 MMHG | OXYGEN SATURATION: 98 %

## 2020-08-31 DIAGNOSIS — Z3A.01 LESS THAN 8 WEEKS GESTATION OF PREGNANCY: ICD-10-CM

## 2020-08-31 DIAGNOSIS — R11.2 NON-INTRACTABLE VOMITING WITH NAUSEA, UNSPECIFIED VOMITING TYPE: ICD-10-CM

## 2020-08-31 LAB
APPEARANCE UR: ABNORMAL
BACTERIA #/AREA URNS HPF: ABNORMAL /HPF
BILIRUB UR QL STRIP.AUTO: NEGATIVE
COLOR UR: YELLOW
EPI CELLS #/AREA URNS HPF: ABNORMAL /HPF
GLUCOSE UR STRIP.AUTO-MCNC: NEGATIVE MG/DL
HCG UR QL: POSITIVE
HYALINE CASTS #/AREA URNS LPF: ABNORMAL /LPF
KETONES UR STRIP.AUTO-MCNC: >=160 MG/DL
LEUKOCYTE ESTERASE UR QL STRIP.AUTO: ABNORMAL
MICRO URNS: ABNORMAL
MUCOUS THREADS #/AREA URNS HPF: ABNORMAL /HPF
NITRITE UR QL STRIP.AUTO: NEGATIVE
PH UR STRIP.AUTO: 5.5 [PH] (ref 5–8)
PROT UR QL STRIP: 30 MG/DL
RBC # URNS HPF: ABNORMAL /HPF
RBC UR QL AUTO: NEGATIVE
SP GR UR STRIP.AUTO: 1.04
UROBILINOGEN UR STRIP.AUTO-MCNC: 0.2 MG/DL
WBC #/AREA URNS HPF: ABNORMAL /HPF

## 2020-08-31 PROCEDURE — 81001 URINALYSIS AUTO W/SCOPE: CPT

## 2020-08-31 PROCEDURE — 700111 HCHG RX REV CODE 636 W/ 250 OVERRIDE (IP): Performed by: EMERGENCY MEDICINE

## 2020-08-31 PROCEDURE — 81025 URINE PREGNANCY TEST: CPT

## 2020-08-31 PROCEDURE — 87086 URINE CULTURE/COLONY COUNT: CPT

## 2020-08-31 PROCEDURE — 99283 EMERGENCY DEPT VISIT LOW MDM: CPT

## 2020-08-31 RX ORDER — ONDANSETRON 4 MG/1
4 TABLET, ORALLY DISINTEGRATING ORAL EVERY 8 HOURS PRN
Qty: 10 TAB | Refills: 0 | Status: ON HOLD | OUTPATIENT
Start: 2020-08-31 | End: 2021-04-11

## 2020-08-31 RX ORDER — ONDANSETRON 4 MG/1
4 TABLET, ORALLY DISINTEGRATING ORAL ONCE
Status: COMPLETED | OUTPATIENT
Start: 2020-08-31 | End: 2020-08-31

## 2020-08-31 RX ORDER — DOXYLAMINE SUCCINATE AND PYRIDOXINE HYDROCHLORIDE, DELAYED RELEASE TABLETS 10 MG/10 MG 10; 10 MG/1; MG/1
1 TABLET, DELAYED RELEASE ORAL
Qty: 60 TAB | Refills: 0 | Status: ON HOLD | OUTPATIENT
Start: 2020-08-31 | End: 2021-04-11

## 2020-08-31 RX ADMIN — ONDANSETRON 4 MG: 4 TABLET, ORALLY DISINTEGRATING ORAL at 18:06

## 2020-09-01 NOTE — ED TRIAGE NOTES
Chief Complaint   Patient presents with   • Vomiting     Pt reports + prgnancy test on Thurs and has been vomiting since, pt states today emesis had light tinged blood in it once. last menses 7/20   • Pregnancy   • Blood in Sputum     Pt given urinary supplies/instruction.  Pt/staff masked and in appropriate PPE during encounter.  Pt denies fever/travel or being in contact with anyone testing positive for COVID/Corona.

## 2020-09-01 NOTE — ED NOTES
Patient resting on cart, awake, alert, oriented x 4. Patient updated on POC, verbalizes understanding. Assessment unchanged. Denies needs or questions, call light within reach, will continue to monitor. Visitor remains present at bedside. Pt states feeling slightly improved after med administration. Provided with warm blanket for comfort.

## 2020-09-01 NOTE — ED NOTES
Pt leaving ED a/o x 4 GCS 15 no PIV in place ambulatory without assistance with steady gait. Pt leaving w/ significant other. Pt given d/c instructions and verbalized understanding. Pt given prescriptions and verbalized understandings.

## 2020-09-01 NOTE — ED PROVIDER NOTES
"ED Provider Note    Scribed for Shu Donahue M.D. by Chavez Jara. 8/31/2020, 5:48 PM.    Primary care provider: Mary Lion M.D.  Means of arrival: Walk in  History obtained from: Patient  History limited by: None    CHIEF COMPLAINT  Chief Complaint   Patient presents with   • Vomiting     Pt reports + prgnancy test on Thurs and has been vomiting since, pt states today emesis had light tinged blood in it once. last menses 7/20   • Pregnancy   • Blood in Sputum       This patient was cared for during the COVID-19 pandemic. History and physical exam may be limited/truncated by the inherent challenges of PPE and the need to decrease staff exposure to novel coronavirus. Some aspects of disease management may be different to protect staff and help slow the spread of disease. I verified that, if possible, the patient was wearing a mask and I was wearing appropriate PPE every time I encountered the patient.      HPI Tabbatha Kaitlyn Reyes is a 24 y.o. female who presents to the Emergency Department complaining of vomiting with blood noted in the vomit onset 4 days ago. Patient states she had a positive pregnancy test that day. Since then, she has had frequent nausea and vomiting. States she cannot hold anything down. States today she noticed that the vomit was a \"tinted brown color.\" She looked up her symptoms on the Internet which suggested that she go to the ED for evaluation. Patient states she can drink some water, but it will still come back up. States it is difficult for her to think about food and eating, as it makes her more nauseous. She has the nausea even when she is not hungry. She has been taking a [facility] vitamin pill in the mornings but does not think it worsens the nausea. Denies taking any medications for the nausea. Patient notes that she had some nausea during her prior pregnancy. She mentions that this is her 4th pregnancy. States her first pregnancy resulted in a miscarriage, and " "she had an  with the second pregnancy. She has one child at home who was born via vaginal delivery. Except for having a bacterial infection, she denies any complications during pregnancy or delivery of her child. States Dr. Martinez was her OBGYN provider during her prior pregnancy. No reports of any recent fevers.    REVIEW OF SYSTEMS  Pertinent positives include nausea, vomiting, possible blood in vomit, loss of appetite (due to N/V). Pertinent negatives include no recent fevers.  All other systems reviewed and negative.    PAST MEDICAL HISTORY   None noted    SURGICAL HISTORY   has a past surgical history that includes cystectomy.    SOCIAL HISTORY  Social History     Tobacco Use   • Smoking status: Never Smoker   • Smokeless tobacco: Never Used   Substance Use Topics   • Alcohol use: No     Alcohol/week: 0.0 oz   • Drug use: Not Currently     Types: Marijuana, Inhaled      Social History     Substance and Sexual Activity   Drug Use Not Currently   • Types: Marijuana, Inhaled       FAMILY HISTORY  Family History   Problem Relation Age of Onset   • Psychiatric Illness Mother         bipolar   • Hypertension Father        CURRENT MEDICATIONS  Reviewed.  See Encounter Summary.     ALLERGIES  No Known Allergies    PHYSICAL EXAM  VITAL SIGNS: /73   Pulse 74   Temp 36.3 °C (97.3 °F) (Temporal)   Resp 16   Ht 1.626 m (5' 4\")   Wt 67.9 kg (149 lb 11.1 oz)   SpO2 99%   BMI 25.69 kg/m²   Constitutional: Alert in no apparent distress.  HENT: No signs of trauma, Bilateral external ears normal, Nose normal. Slightly dry mucus membranes.  Eyes: Pupils are equal and reactive, Conjunctiva normal, Non-icteric.   Neck: Normal range of motion, No tenderness, Supple, No stridor.   Cardiovascular: Regular rate and rhythm, no murmurs.   Thorax & Lungs: Normal breath sounds, No respiratory distress, No wheezing, No chest tenderness.   Abdomen: Bowel sounds normal, Soft, No tenderness, No masses, No peritoneal " signs.  Skin: Warm, Dry, No erythema, No rash.   Musculoskeletal:  No major deformities noted.  Neurologic: Alert, moving all extremities without difficulty, no focal deficits.    LABS  Labs Reviewed   URINALYSIS,CULTURE IF INDICATED - Abnormal; Notable for the following components:       Result Value    Character Turbid (*)     Protein 30 (*)     Leukocyte Esterase Large (*)     All other components within normal limits   HCG QUALITATIVE UR - Abnormal; Notable for the following components:    Beta-Hcg Urine Positive (*)     All other components within normal limits   URINE MICROSCOPIC (W/UA) - Abnormal; Notable for the following components:    WBC 5-10 (*)     Bacteria Moderate (*)     Epithelial Cells Moderate (*)     All other components within normal limits   URINE CULTURE(NEW)     All labs reviewed by me.    RADIOLOGY  No orders to display     The radiologist's interpretation of all radiological studies have been reviewed by me.    COURSE & MEDICAL DECISION MAKING  Pertinent Labs & Imaging studies reviewed. (See chart for details)    5:48 PM - Patient seen and examined at bedside. Discussed plan of care which includes treating the nausea and ordering labs. Patient understands and agrees to plan. Patient will be treated with zofran ODT 4 mg. Ordered urinalysis, beta-HCG qualitative urine to evaluate her symptoms.     6:28 PM Ordered urine microscopic    8:28 PM  Patient tolerated p.o. challenge after oral Zofran without difficulty.  She has no evidence of urinary tract infection.  She is pregnant.  She has no abdominal pain or vaginal bleeding I do not think imaging or blood work is required at this time.  She does a history of being a positive on previous pregnancy.  Patient will be given prescriptions for Zofran and Diclegis and discharged.     The patient will return for new or worsening symptoms and is stable at the time of discharge. Patient was given return precautions. Patient and/or family member  verbalizes understanding and will comply.    DISPOSITION:  Patient will be discharged home in stable condition.    FOLLOW UP:  Corinne E Capurro, M.D.  [address]  [address]  [place] [phone] [phone]      Make follow-up appointment in the next few weeks for management of your nausea    [facility], Emergency Dept  [address] [facility]  [place] [id number] [phone]    Return for worsening pain, vaginal bleeding or other concerns      OUTPATIENT MEDICATIONS:  New Prescriptions    DOXYLAMINE-PYRIDOXINE (DICLEGIS) 10-10 MG TABLET DELAYED RESPONSE DELAYED-RELEASE TABLET    Take 1 Tab by mouth every bedtime.    ONDANSETRON (ZOFRAN ODT) 4 MG TABLET DISPERSIBLE    Take 1 Tab by mouth every 8 hours as needed.         FINAL IMPRESSION  1. Non-intractable vomiting with nausea, unspecified vomiting type    2. Less than 8 weeks gestation of pregnancy               This dictation has been created using voice recognition software and/or scribes. The accuracy of the dictation is limited by the abilities of the software and the expertise of the scribes. I expect there may be some errors of grammar and possibly content. I made every attempt to manually correct the errors within my dictation. However, errors related to voice recognition software and/or scribes may still exist and should be interpreted within the appropriate context.     IChavez (Scribe), am scribing for, and in the presence of, Shu Donahue M.D..    Electronically signed by: Chavez Jara (Scribe), 8/31/2020    Shu BANUELOS M.D. personally performed the services described in this documentation, as scribed by Chavez Jara in my presence, and it is both accurate and complete. C    The note accurately reflects work and decisions made by me.  Shu Donahue M.D.  8/31/2020  8:29 PM

## 2020-09-01 NOTE — ED NOTES
Patient to ED room with steady, upright gait. Pt positioned on cart for comfort, updated on pending ERP evaluation & POC. Pt denies needs or questions, call light within reach, cart in low, locked position. Will continue to monitor. Visitor at bedside, provided with gown to change into.

## 2020-09-02 LAB
BACTERIA UR CULT: NORMAL
SIGNIFICANT IND 70042: NORMAL
SITE SITE: NORMAL
SOURCE SOURCE: NORMAL

## 2020-11-05 ENCOUNTER — HOSPITAL ENCOUNTER (OUTPATIENT)
Facility: MEDICAL CENTER | Age: 25
End: 2020-11-05
Attending: CLINICAL NURSE SPECIALIST
Payer: COMMERCIAL

## 2020-11-05 PROCEDURE — 86900 BLOOD TYPING SEROLOGIC ABO: CPT

## 2020-11-05 PROCEDURE — 86901 BLOOD TYPING SEROLOGIC RH(D): CPT

## 2020-11-05 PROCEDURE — 87389 HIV-1 AG W/HIV-1&-2 AB AG IA: CPT

## 2020-11-05 PROCEDURE — 86850 RBC ANTIBODY SCREEN: CPT

## 2020-11-05 PROCEDURE — 87086 URINE CULTURE/COLONY COUNT: CPT

## 2020-11-05 PROCEDURE — 86762 RUBELLA ANTIBODY: CPT

## 2020-11-05 PROCEDURE — 85025 COMPLETE CBC W/AUTO DIFF WBC: CPT

## 2020-11-05 PROCEDURE — 86787 VARICELLA-ZOSTER ANTIBODY: CPT

## 2020-11-05 PROCEDURE — 87340 HEPATITIS B SURFACE AG IA: CPT

## 2020-11-05 PROCEDURE — 86780 TREPONEMA PALLIDUM: CPT

## 2020-11-06 LAB
ABO GROUP BLD: NORMAL
BASOPHILS # BLD AUTO: 0.3 % (ref 0–1.8)
BASOPHILS # BLD: 0.03 K/UL (ref 0–0.12)
BLD GP AB SCN SERPL QL: NORMAL
EOSINOPHIL # BLD AUTO: 0.06 K/UL (ref 0–0.51)
EOSINOPHIL NFR BLD: 0.6 % (ref 0–6.9)
ERYTHROCYTE [DISTWIDTH] IN BLOOD BY AUTOMATED COUNT: 45.1 FL (ref 35.9–50)
HBV SURFACE AG SER QL: ABNORMAL
HCT VFR BLD AUTO: 39.2 % (ref 37–47)
HGB BLD-MCNC: 12.8 G/DL (ref 12–16)
HIV 1+2 AB+HIV1 P24 AG SERPL QL IA: NORMAL
IMM GRANULOCYTES # BLD AUTO: 0.04 K/UL (ref 0–0.11)
IMM GRANULOCYTES NFR BLD AUTO: 0.4 % (ref 0–0.9)
LYMPHOCYTES # BLD AUTO: 1.89 K/UL (ref 1–4.8)
LYMPHOCYTES NFR BLD: 19.7 % (ref 22–41)
MCH RBC QN AUTO: 30.7 PG (ref 27–33)
MCHC RBC AUTO-ENTMCNC: 32.7 G/DL (ref 33.6–35)
MCV RBC AUTO: 94 FL (ref 81.4–97.8)
MONOCYTES # BLD AUTO: 0.31 K/UL (ref 0–0.85)
MONOCYTES NFR BLD AUTO: 3.2 % (ref 0–13.4)
NEUTROPHILS # BLD AUTO: 7.24 K/UL (ref 2–7.15)
NEUTROPHILS NFR BLD: 75.8 % (ref 44–72)
NRBC # BLD AUTO: 0 K/UL
NRBC BLD-RTO: 0 /100 WBC
PLATELET # BLD AUTO: 286 K/UL (ref 164–446)
PMV BLD AUTO: 10.3 FL (ref 9–12.9)
RBC # BLD AUTO: 4.17 M/UL (ref 4.2–5.4)
RH BLD: NORMAL
RUBV AB SER QL: 247 IU/ML
TREPONEMA PALLIDUM IGG+IGM AB [PRESENCE] IN SERUM OR PLASMA BY IMMUNOASSAY: ABNORMAL
WBC # BLD AUTO: 9.6 K/UL (ref 4.8–10.8)

## 2020-11-06 PROCEDURE — 86850 RBC ANTIBODY SCREEN: CPT

## 2020-11-06 PROCEDURE — 86900 BLOOD TYPING SEROLOGIC ABO: CPT

## 2020-11-06 PROCEDURE — 86901 BLOOD TYPING SEROLOGIC RH(D): CPT

## 2020-11-08 LAB
BACTERIA UR CULT: NORMAL
SIGNIFICANT IND 70042: NORMAL
SITE SITE: NORMAL
SOURCE SOURCE: NORMAL
VZV IGG SER IA-ACNC: 0.25

## 2020-11-09 ENCOUNTER — HOSPITAL ENCOUNTER (EMERGENCY)
Facility: MEDICAL CENTER | Age: 25
End: 2020-11-09
Attending: EMERGENCY MEDICINE
Payer: COMMERCIAL

## 2020-11-09 VITALS
OXYGEN SATURATION: 98 % | DIASTOLIC BLOOD PRESSURE: 58 MMHG | RESPIRATION RATE: 16 BRPM | TEMPERATURE: 98.5 F | SYSTOLIC BLOOD PRESSURE: 107 MMHG | WEIGHT: 154.1 LBS | BODY MASS INDEX: 27.3 KG/M2 | HEIGHT: 63 IN | HEART RATE: 57 BPM

## 2020-11-09 DIAGNOSIS — R55 NEAR SYNCOPE: ICD-10-CM

## 2020-11-09 DIAGNOSIS — Z3A.16 16 WEEKS GESTATION OF PREGNANCY: ICD-10-CM

## 2020-11-09 LAB
ALBUMIN SERPL BCP-MCNC: 3.6 G/DL (ref 3.2–4.9)
ALBUMIN/GLOB SERPL: 1.3 G/DL
ALP SERPL-CCNC: 60 U/L (ref 30–99)
ALT SERPL-CCNC: <5 U/L (ref 2–50)
ANION GAP SERPL CALC-SCNC: 13 MMOL/L (ref 7–16)
AST SERPL-CCNC: 9 U/L (ref 12–45)
BASOPHILS # BLD AUTO: 0.3 % (ref 0–1.8)
BASOPHILS # BLD: 0.03 K/UL (ref 0–0.12)
BILIRUB SERPL-MCNC: 0.2 MG/DL (ref 0.1–1.5)
BUN SERPL-MCNC: 6 MG/DL (ref 8–22)
CALCIUM SERPL-MCNC: 9.3 MG/DL (ref 8.4–10.2)
CHLORIDE SERPL-SCNC: 103 MMOL/L (ref 96–112)
CO2 SERPL-SCNC: 25 MMOL/L (ref 20–33)
CREAT SERPL-MCNC: 0.47 MG/DL (ref 0.5–1.4)
EKG IMPRESSION: NORMAL
EOSINOPHIL # BLD AUTO: 0.04 K/UL (ref 0–0.51)
EOSINOPHIL NFR BLD: 0.3 % (ref 0–6.9)
ERYTHROCYTE [DISTWIDTH] IN BLOOD BY AUTOMATED COUNT: 41.8 FL (ref 35.9–50)
GLOBULIN SER CALC-MCNC: 2.8 G/DL (ref 1.9–3.5)
GLUCOSE SERPL-MCNC: 69 MG/DL (ref 65–99)
HCT VFR BLD AUTO: 40.2 % (ref 37–47)
HGB BLD-MCNC: 13.6 G/DL (ref 12–16)
IMM GRANULOCYTES # BLD AUTO: 0.04 K/UL (ref 0–0.11)
IMM GRANULOCYTES NFR BLD AUTO: 0.3 % (ref 0–0.9)
LYMPHOCYTES # BLD AUTO: 1.8 K/UL (ref 1–4.8)
LYMPHOCYTES NFR BLD: 15.6 % (ref 22–41)
MCH RBC QN AUTO: 30.6 PG (ref 27–33)
MCHC RBC AUTO-ENTMCNC: 33.8 G/DL (ref 33.6–35)
MCV RBC AUTO: 90.5 FL (ref 81.4–97.8)
MONOCYTES # BLD AUTO: 0.41 K/UL (ref 0–0.85)
MONOCYTES NFR BLD AUTO: 3.6 % (ref 0–13.4)
NEUTROPHILS # BLD AUTO: 9.2 K/UL (ref 2–7.15)
NEUTROPHILS NFR BLD: 79.9 % (ref 44–72)
NRBC # BLD AUTO: 0 K/UL
NRBC BLD-RTO: 0 /100 WBC
PLATELET # BLD AUTO: 296 K/UL (ref 164–446)
PMV BLD AUTO: 9 FL (ref 9–12.9)
POTASSIUM SERPL-SCNC: 4 MMOL/L (ref 3.6–5.5)
PROT SERPL-MCNC: 6.4 G/DL (ref 6–8.2)
RBC # BLD AUTO: 4.44 M/UL (ref 4.2–5.4)
SODIUM SERPL-SCNC: 141 MMOL/L (ref 135–145)
TROPONIN T SERPL-MCNC: <6 NG/L (ref 6–19)
WBC # BLD AUTO: 11.5 K/UL (ref 4.8–10.8)

## 2020-11-09 PROCEDURE — 93005 ELECTROCARDIOGRAM TRACING: CPT | Performed by: EMERGENCY MEDICINE

## 2020-11-09 PROCEDURE — 85025 COMPLETE CBC W/AUTO DIFF WBC: CPT

## 2020-11-09 PROCEDURE — 99283 EMERGENCY DEPT VISIT LOW MDM: CPT

## 2020-11-09 PROCEDURE — 93005 ELECTROCARDIOGRAM TRACING: CPT

## 2020-11-09 PROCEDURE — 80053 COMPREHEN METABOLIC PANEL: CPT

## 2020-11-09 PROCEDURE — 84484 ASSAY OF TROPONIN QUANT: CPT

## 2020-11-09 NOTE — ED NOTES
Pt back to triage desk  Denies dizziness  C/o increased lower abd cramping, denies vaginal bleeding

## 2020-11-09 NOTE — ED TRIAGE NOTES
"Pt ambulates to triage wearing mask  Chief Complaint   Patient presents with   • Syncope   • Pregnancy   Pt reports \"I almost passed out at work\" \"I saw stars\" 16 weeks pregnant, denies vaginal bleeding      Pt A & 0 x 4, speech clear, ambulates well  COVID-19 screening criteria completed, pt denies high risk travel and denies contact with COVID-19 positive pt    Pt updated on triage process and asked to inform RN of any changes while waiting in lobby.         "

## 2020-11-09 NOTE — ED PROVIDER NOTES
ED Provider Note    CHIEF COMPLAINT  Chief Complaint   Patient presents with   • Syncope   • Pregnancy        HPI  Tabbatha Kaitlyn Reyes is a 24 y.o. female who presents to the ED with complaints of near syncopal event.  The patient is approximately 16 weeks pregnant she was at work where she started feeling a little lightheaded as she was prepping a meal with some peppers and she was standing there.  The patient sat down and still felt slightly dizzy so she decided to come to the emergency department for evaluation.  The patient had some abdominal cramps but denies any overt fevers chills nausea vomiting.  Patient did wait in the waiting room for several hours.  Upon my evaluation she states the symptoms are improved she denies any fevers chills nausea vomiting diarrhea overt abdominal pain or any other symptoms.  The patient presents for evaluation.    REVIEW OF SYSTEMS  See HPI for further details. All other systems are negative.     PAST MEDICAL HISTORY  History reviewed. No pertinent past medical history.    FAMILY HISTORY  Family History   Problem Relation Age of Onset   • Psychiatric Illness Mother         bipolar   • Hypertension Father        SOCIAL HISTORY  Social History     Socioeconomic History   • Marital status: Single     Spouse name: Not on file   • Number of children: Not on file   • Years of education: Not on file   • Highest education level: Not on file   Occupational History   • Not on file   Social Needs   • Financial resource strain: Not on file   • Food insecurity     Worry: Not on file     Inability: Not on file   • Transportation needs     Medical: Not on file     Non-medical: Not on file   Tobacco Use   • Smoking status: Never Smoker   • Smokeless tobacco: Never Used   Substance and Sexual Activity   • Alcohol use: No     Alcohol/week: 0.0 oz   • Drug use: Not Currently     Types: Marijuana, Inhaled   • Sexual activity: Yes     Partners: Male   Lifestyle   • Physical activity     Days per  "week: Not on file     Minutes per session: Not on file   • Stress: Not on file   Relationships   • Social connections     Talks on phone: Not on file     Gets together: Not on file     Attends [employer]ism service: Not on file     Active member of club or organization: Not on file     Attends meetings of clubs or organizations: Not on file     Relationship status: Not on file   • Intimate partner violence     Fear of current or ex partner: Not on file     Emotionally abused: Not on file     Physically abused: Not on file     Forced sexual activity: Not on file   Other Topics Concern   • Not on file   Social History Narrative    [employer] 11th grade    Grades are OK    Basketball       Mary Lion M.D.      SURGICAL HISTORY  Past Surgical History:   Procedure Laterality Date   • CYSTECTOMY      lung at 3 months old       CURRENT MEDICATIONS  Home Medications    **Home medications have not yet been reviewed for this encounter**       No current facility-administered medications on file prior to encounter.      Current Outpatient Medications on File Prior to Encounter   Medication Sig Dispense Refill   • ondansetron (ZOFRAN ODT) 4 MG TABLET DISPERSIBLE Take 1 Tab by mouth every 8 hours as needed. 10 Tab 0   • Doxylamine-Pyridoxine (DICLEGIS) 10-10 MG Tablet Delayed Response delayed-release tablet Take 1 Tab by mouth every bedtime. 60 Tab 0         ALLERGIES  No Known Allergies    PHYSICAL EXAM  VITAL SIGNS: /58   Pulse (!) 57   Temp 36.9 °C (98.5 °F) (Temporal)   Resp 16   Ht 1.6 m (5' 3\")   Wt 69.9 kg (154 lb 1.6 oz)   LMP 07/20/2020   SpO2 98%   BMI 27.30 kg/m²    Pulse Oximetry was obtained. It showed a reading of Pulse Oximetry: 98 %.  I interpreted this as nonhypoxic.     Constitutional: Well developed, Well nourished, No acute distress, Non-toxic appearance.   HENT: Normocephalic, Atraumatic, Bilateral external ears normal, bilateral tympanic membranes normal, Oropharynx mucous membranes, No oral " exudates, Nose normal.   Eyes:  conjunctiva is normal, there are no signs of exudate.   Neck: Supple, no cervical lymphadenopathy, no meningeal signs..   Lymphatic: No lymphadenopathy noted.   Cardiovascular: Regular rate and rhythm without murmurs gallops or rubs.   Thorax & Lungs: Lungs are clear to auscultation bilaterally, there are no wheezes no rales. Chest wall is nontender.  Abdomen: Soft, nontender nondistended. Bowel sounds are present.   Skin: Warm, Dry, No erythema,   Back: No tenderness, No CVA tenderness.  Musculoskeletal: Good range of motion in all major joints. No tenderness to palpation or major deformities noted. Intact distal pulses, no clubbing, no cyanosis, no edema     Neurologic: Alert & oriented x 3, Normal motor function, Normal sensory function, No focal deficits noted.   Psychiatric: Affect normal, Judgment normal, Mood normal.     EKG  Interpreted below by myself    RADIOLOGY/PROCEDURES  No orders to display       Results for orders placed or performed during the hospital encounter of 11/09/20   CBC with Differential   Result Value Ref Range    WBC 11.5 (H) 4.8 - 10.8 K/uL    RBC 4.44 4.20 - 5.40 M/uL    Hemoglobin 13.6 12.0 - 16.0 g/dL    Hematocrit 40.2 37.0 - 47.0 %    MCV 90.5 81.4 - 97.8 fL    MCH 30.6 27.0 - 33.0 pg    MCHC 33.8 33.6 - 35.0 g/dL    RDW 41.8 35.9 - 50.0 fL    Platelet Count 296 164 - 446 K/uL    MPV 9.0 9.0 - 12.9 fL    Neutrophils-Polys 79.90 (H) 44.00 - 72.00 %    Lymphocytes 15.60 (L) 22.00 - 41.00 %    Monocytes 3.60 0.00 - 13.40 %    Eosinophils 0.30 0.00 - 6.90 %    Basophils 0.30 0.00 - 1.80 %    Immature Granulocytes 0.30 0.00 - 0.90 %    Nucleated RBC 0.00 /100 WBC    Neutrophils (Absolute) 9.20 (H) 2.00 - 7.15 K/uL    Lymphs (Absolute) 1.80 1.00 - 4.80 K/uL    Monos (Absolute) 0.41 0.00 - 0.85 K/uL    Eos (Absolute) 0.04 0.00 - 0.51 K/uL    Baso (Absolute) 0.03 0.00 - 0.12 K/uL    Immature Granulocytes (abs) 0.04 0.00 - 0.11 K/uL    NRBC (Absolute) 0.00  K/uL   Complete Metabolic Panel (CMP)   Result Value Ref Range    Sodium 141 135 - 145 mmol/L    Potassium 4.0 3.6 - 5.5 mmol/L    Chloride 103 96 - 112 mmol/L    Co2 25 20 - 33 mmol/L    Anion Gap 13.0 7.0 - 16.0    Glucose 69 65 - 99 mg/dL    Bun 6 (L) 8 - 22 mg/dL    Creatinine 0.47 (L) 0.50 - 1.40 mg/dL    Calcium 9.3 8.4 - 10.2 mg/dL    AST(SGOT) 9 (L) 12 - 45 U/L    ALT(SGPT) <5 2 - 50 U/L    Alkaline Phosphatase 60 30 - 99 U/L    Total Bilirubin 0.2 0.1 - 1.5 mg/dL    Albumin 3.6 3.2 - 4.9 g/dL    Total Protein 6.4 6.0 - 8.2 g/dL    Globulin 2.8 1.9 - 3.5 g/dL    A-G Ratio 1.3 g/dL   Troponin STAT   Result Value Ref Range    Troponin T <6 6 - 19 ng/L   ESTIMATED GFR   Result Value Ref Range    GFR If African American >60 >60 mL/min/1.73 m 2    GFR If Non African American >60 >60 mL/min/1.73 m 2   EKG   Result Value Ref Range    Report       [facility] Emergency Dept.    Test Date:  2020  Pt Name:    TABBATHA REYES               Department: [facility]  MRN:        [id number]                      Room:       [place] [address]  Gender:     Female                       Technician: ANGY  :        1995                   Requested By:ER TRIAGE PROTOCOL  Order #:    [id number]                    Reading MD: GOKUL LIZAMA MD    Measurements  Intervals                                Axis  Rate:       55                           P:          60  MD:         148                          QRS:        11  QRSD:       87                           T:          36  QT:         419  QTc:        401    Interpretive Statements  Sinus bradycardia  No previous ECG available for comparison  Otherwise normal ECG  Electronically Signed On 2020 15:47:13 PST by GOKUL LIZAMA MD           COURSE & MEDICAL DECISION MAKING  Pertinent Labs & Imaging studies reviewed. (See chart for details)  Presents for evaluation.  Clinically the patient appears well EKG is normal laboratory studies are as above.  The  patient is currently asymptomatic at this point could very well have been mild dehydration but at this point there is no significant findings the patient is feeling improved so I do recommend continue pushing fluids follow-up with her gynecologist/obstetrician Dr. Phan as needed return as needed.    FINAL IMPRESSION  1. 16 weeks gestation of pregnancy     2. Near syncope            The patient will return for new or worsening symptoms and is stable at the time of discharge.    The patient is referred to a primary physician for blood pressure management, diabetic screening, and for all other preventative health concerns.        DISPOSITION:  Patient will be discharged home in stable condition.    FOLLOW UP:  Corinne E Capurro, M.D.  [address] [address]  [phone]            OUTPATIENT MEDICATIONS:  Discharge Medication List as of 11/9/2020  4:11 PM                  Electronically signed by: Preet Clark M.D., 11/9/2020 3:58 PM

## 2020-11-10 NOTE — ED NOTES
Discharge instructions given to pt with verbalized understanding.  Pt ambulatory out of the ER at this time.

## 2021-02-01 ENCOUNTER — HOSPITAL ENCOUNTER (OUTPATIENT)
Facility: MEDICAL CENTER | Age: 26
End: 2021-02-01
Attending: OBSTETRICS & GYNECOLOGY
Payer: COMMERCIAL

## 2021-02-01 LAB
BASOPHILS # BLD AUTO: 0.3 % (ref 0–1.8)
BASOPHILS # BLD: 0.03 K/UL (ref 0–0.12)
EOSINOPHIL # BLD AUTO: 0.05 K/UL (ref 0–0.51)
EOSINOPHIL NFR BLD: 0.5 % (ref 0–6.9)
ERYTHROCYTE [DISTWIDTH] IN BLOOD BY AUTOMATED COUNT: 42.4 FL (ref 35.9–50)
GLUCOSE 1H P 50 G GLC PO SERPL-MCNC: 123 MG/DL (ref 70–139)
HCT VFR BLD AUTO: 40.8 % (ref 37–47)
HGB BLD-MCNC: 13.3 G/DL (ref 12–16)
IMM GRANULOCYTES # BLD AUTO: 0.03 K/UL (ref 0–0.11)
IMM GRANULOCYTES NFR BLD AUTO: 0.3 % (ref 0–0.9)
LYMPHOCYTES # BLD AUTO: 1.94 K/UL (ref 1–4.8)
LYMPHOCYTES NFR BLD: 20.4 % (ref 22–41)
MCH RBC QN AUTO: 30.5 PG (ref 27–33)
MCHC RBC AUTO-ENTMCNC: 32.6 G/DL (ref 33.6–35)
MCV RBC AUTO: 93.6 FL (ref 81.4–97.8)
MONOCYTES # BLD AUTO: 0.34 K/UL (ref 0–0.85)
MONOCYTES NFR BLD AUTO: 3.6 % (ref 0–13.4)
NEUTROPHILS # BLD AUTO: 7.12 K/UL (ref 2–7.15)
NEUTROPHILS NFR BLD: 74.9 % (ref 44–72)
NRBC # BLD AUTO: 0 K/UL
NRBC BLD-RTO: 0 /100 WBC
PLATELET # BLD AUTO: 295 K/UL (ref 164–446)
PMV BLD AUTO: 9.7 FL (ref 9–12.9)
RBC # BLD AUTO: 4.36 M/UL (ref 4.2–5.4)
TREPONEMA PALLIDUM IGG+IGM AB [PRESENCE] IN SERUM OR PLASMA BY IMMUNOASSAY: NORMAL
WBC # BLD AUTO: 9.5 K/UL (ref 4.8–10.8)

## 2021-02-01 PROCEDURE — 82950 GLUCOSE TEST: CPT

## 2021-02-01 PROCEDURE — 86780 TREPONEMA PALLIDUM: CPT

## 2021-02-01 PROCEDURE — 85025 COMPLETE CBC W/AUTO DIFF WBC: CPT

## 2021-04-10 ENCOUNTER — HOSPITAL ENCOUNTER (INPATIENT)
Facility: MEDICAL CENTER | Age: 26
LOS: 1 days | End: 2021-04-11
Attending: OBSTETRICS & GYNECOLOGY | Admitting: OBSTETRICS & GYNECOLOGY
Payer: COMMERCIAL

## 2021-04-10 LAB
ALBUMIN SERPL BCP-MCNC: 3.1 G/DL (ref 3.2–4.9)
ALBUMIN/GLOB SERPL: 1.2 G/DL
ALP SERPL-CCNC: 147 U/L (ref 30–99)
ALT SERPL-CCNC: 5 U/L (ref 2–50)
ANION GAP SERPL CALC-SCNC: 14 MMOL/L (ref 7–16)
AST SERPL-CCNC: 7 U/L (ref 12–45)
BASOPHILS # BLD AUTO: 0.2 % (ref 0–1.8)
BASOPHILS # BLD: 0.03 K/UL (ref 0–0.12)
BILIRUB SERPL-MCNC: 0.3 MG/DL (ref 0.1–1.5)
BUN SERPL-MCNC: 7 MG/DL (ref 8–22)
CALCIUM SERPL-MCNC: 8.4 MG/DL (ref 8.5–10.5)
CHLORIDE SERPL-SCNC: 103 MMOL/L (ref 96–112)
CO2 SERPL-SCNC: 17 MMOL/L (ref 20–33)
CREAT SERPL-MCNC: 0.54 MG/DL (ref 0.5–1.4)
CREAT UR-MCNC: 11.12 MG/DL
EOSINOPHIL # BLD AUTO: 0.07 K/UL (ref 0–0.51)
EOSINOPHIL NFR BLD: 0.5 % (ref 0–6.9)
ERYTHROCYTE [DISTWIDTH] IN BLOOD BY AUTOMATED COUNT: 38.2 FL (ref 35.9–50)
ERYTHROCYTE [DISTWIDTH] IN BLOOD BY AUTOMATED COUNT: 38.6 FL (ref 35.9–50)
GLOBULIN SER CALC-MCNC: 2.6 G/DL (ref 1.9–3.5)
GLUCOSE SERPL-MCNC: 90 MG/DL (ref 65–99)
HCT VFR BLD AUTO: 37.7 % (ref 37–47)
HCT VFR BLD AUTO: 40 % (ref 37–47)
HGB BLD-MCNC: 12.7 G/DL (ref 12–16)
HGB BLD-MCNC: 13.6 G/DL (ref 12–16)
HOLDING TUBE BB 8507: NORMAL
IMM GRANULOCYTES # BLD AUTO: 0.07 K/UL (ref 0–0.11)
IMM GRANULOCYTES NFR BLD AUTO: 0.5 % (ref 0–0.9)
LYMPHOCYTES # BLD AUTO: 2.93 K/UL (ref 1–4.8)
LYMPHOCYTES NFR BLD: 21.5 % (ref 22–41)
MAGNESIUM SERPL-MCNC: 4.3 MG/DL (ref 1.5–2.5)
MAGNESIUM SERPL-MCNC: 5.7 MG/DL (ref 1.5–2.5)
MAGNESIUM SERPL-MCNC: 6.1 MG/DL (ref 1.5–2.5)
MCH RBC QN AUTO: 29.3 PG (ref 27–33)
MCH RBC QN AUTO: 29.6 PG (ref 27–33)
MCHC RBC AUTO-ENTMCNC: 33.7 G/DL (ref 33.6–35)
MCHC RBC AUTO-ENTMCNC: 34 G/DL (ref 33.6–35)
MCV RBC AUTO: 87.1 FL (ref 81.4–97.8)
MCV RBC AUTO: 87.1 FL (ref 81.4–97.8)
MONOCYTES # BLD AUTO: 0.73 K/UL (ref 0–0.85)
MONOCYTES NFR BLD AUTO: 5.4 % (ref 0–13.4)
NEUTROPHILS # BLD AUTO: 9.8 K/UL (ref 2–7.15)
NEUTROPHILS NFR BLD: 71.9 % (ref 44–72)
NRBC # BLD AUTO: 0 K/UL
NRBC BLD-RTO: 0 /100 WBC
PLATELET # BLD AUTO: 280 K/UL (ref 164–446)
PLATELET # BLD AUTO: 318 K/UL (ref 164–446)
PMV BLD AUTO: 9.4 FL (ref 9–12.9)
PMV BLD AUTO: 9.7 FL (ref 9–12.9)
POTASSIUM SERPL-SCNC: 4 MMOL/L (ref 3.6–5.5)
PROT SERPL-MCNC: 5.7 G/DL (ref 6–8.2)
PROT UR-MCNC: 106 MG/DL (ref 0–15)
PROT/CREAT UR: 9532 MG/G (ref 10–107)
RBC # BLD AUTO: 4.33 M/UL (ref 4.2–5.4)
RBC # BLD AUTO: 4.59 M/UL (ref 4.2–5.4)
SARS-COV+SARS-COV-2 AG RESP QL IA.RAPID: NOTDETECTED
SARS-COV-2 RNA RESP QL NAA+PROBE: NOTDETECTED
SODIUM SERPL-SCNC: 134 MMOL/L (ref 135–145)
SPECIMEN SOURCE: NORMAL
SPECIMEN SOURCE: NORMAL
WBC # BLD AUTO: 13.6 K/UL (ref 4.8–10.8)
WBC # BLD AUTO: 16.6 K/UL (ref 4.8–10.8)

## 2021-04-10 PROCEDURE — 700105 HCHG RX REV CODE 258: Performed by: OBSTETRICS & GYNECOLOGY

## 2021-04-10 PROCEDURE — 36415 COLL VENOUS BLD VENIPUNCTURE: CPT

## 2021-04-10 PROCEDURE — 87426 SARSCOV CORONAVIRUS AG IA: CPT

## 2021-04-10 PROCEDURE — 85025 COMPLETE CBC W/AUTO DIFF WBC: CPT

## 2021-04-10 PROCEDURE — 700111 HCHG RX REV CODE 636 W/ 250 OVERRIDE (IP): Performed by: OBSTETRICS & GYNECOLOGY

## 2021-04-10 PROCEDURE — 82570 ASSAY OF URINE CREATININE: CPT

## 2021-04-10 PROCEDURE — A9270 NON-COVERED ITEM OR SERVICE: HCPCS | Performed by: OBSTETRICS & GYNECOLOGY

## 2021-04-10 PROCEDURE — 85027 COMPLETE CBC AUTOMATED: CPT

## 2021-04-10 PROCEDURE — 84156 ASSAY OF PROTEIN URINE: CPT

## 2021-04-10 PROCEDURE — 700101 HCHG RX REV CODE 250: Performed by: OBSTETRICS & GYNECOLOGY

## 2021-04-10 PROCEDURE — 83735 ASSAY OF MAGNESIUM: CPT

## 2021-04-10 PROCEDURE — 304965 HCHG RECOVERY SERVICES

## 2021-04-10 PROCEDURE — 80053 COMPREHEN METABOLIC PANEL: CPT

## 2021-04-10 PROCEDURE — 770002 HCHG ROOM/CARE - OB PRIVATE (112)

## 2021-04-10 PROCEDURE — 59409 OBSTETRICAL CARE: CPT

## 2021-04-10 PROCEDURE — 700102 HCHG RX REV CODE 250 W/ 637 OVERRIDE(OP): Performed by: OBSTETRICS & GYNECOLOGY

## 2021-04-10 PROCEDURE — U0005 INFEC AGEN DETEC AMPLI PROBE: HCPCS

## 2021-04-10 PROCEDURE — U0003 INFECTIOUS AGENT DETECTION BY NUCLEIC ACID (DNA OR RNA); SEVERE ACUTE RESPIRATORY SYNDROME CORONAVIRUS 2 (SARS-COV-2) (CORONAVIRUS DISEASE [COVID-19]), AMPLIFIED PROBE TECHNIQUE, MAKING USE OF HIGH THROUGHPUT TECHNOLOGIES AS DESCRIBED BY CMS-2020-01-R: HCPCS

## 2021-04-10 RX ORDER — BISACODYL 10 MG
10 SUPPOSITORY, RECTAL RECTAL PRN
Status: DISCONTINUED | OUTPATIENT
Start: 2021-04-10 | End: 2021-04-11 | Stop reason: HOSPADM

## 2021-04-10 RX ORDER — HYDROCODONE BITARTRATE AND ACETAMINOPHEN 10; 325 MG/1; MG/1
1 TABLET ORAL EVERY 4 HOURS PRN
Status: DISCONTINUED | OUTPATIENT
Start: 2021-04-10 | End: 2021-04-11 | Stop reason: HOSPADM

## 2021-04-10 RX ORDER — ONDANSETRON 2 MG/ML
4 INJECTION INTRAMUSCULAR; INTRAVENOUS EVERY 6 HOURS PRN
Status: DISCONTINUED | OUTPATIENT
Start: 2021-04-10 | End: 2021-04-11 | Stop reason: HOSPADM

## 2021-04-10 RX ORDER — OXYCODONE HYDROCHLORIDE AND ACETAMINOPHEN 5; 325 MG/1; MG/1
1 TABLET ORAL EVERY 6 HOURS PRN
Status: DISCONTINUED | OUTPATIENT
Start: 2021-04-10 | End: 2021-04-11 | Stop reason: HOSPADM

## 2021-04-10 RX ORDER — MISOPROSTOL 200 UG/1
800 TABLET ORAL
Status: COMPLETED | OUTPATIENT
Start: 2021-04-10 | End: 2021-04-10

## 2021-04-10 RX ORDER — IBUPROFEN 600 MG/1
600 TABLET ORAL EVERY 6 HOURS PRN
Status: DISCONTINUED | OUTPATIENT
Start: 2021-04-10 | End: 2021-04-11 | Stop reason: HOSPADM

## 2021-04-10 RX ORDER — SODIUM CHLORIDE, SODIUM LACTATE, POTASSIUM CHLORIDE, CALCIUM CHLORIDE 600; 310; 30; 20 MG/100ML; MG/100ML; MG/100ML; MG/100ML
INJECTION, SOLUTION INTRAVENOUS CONTINUOUS
Status: ACTIVE | OUTPATIENT
Start: 2021-04-10 | End: 2021-04-10

## 2021-04-10 RX ORDER — CARBOPROST TROMETHAMINE 250 UG/ML
250 INJECTION, SOLUTION INTRAMUSCULAR
Status: DISCONTINUED | OUTPATIENT
Start: 2021-04-10 | End: 2021-04-11 | Stop reason: HOSPADM

## 2021-04-10 RX ORDER — SODIUM CHLORIDE, SODIUM LACTATE, POTASSIUM CHLORIDE, CALCIUM CHLORIDE 600; 310; 30; 20 MG/100ML; MG/100ML; MG/100ML; MG/100ML
INJECTION, SOLUTION INTRAVENOUS CONTINUOUS
Status: DISCONTINUED | OUTPATIENT
Start: 2021-04-10 | End: 2021-04-11 | Stop reason: ALTCHOICE

## 2021-04-10 RX ORDER — HYDRALAZINE HYDROCHLORIDE 20 MG/ML
5-10 INJECTION INTRAMUSCULAR; INTRAVENOUS PRN
Status: DISCONTINUED | OUTPATIENT
Start: 2021-04-10 | End: 2021-04-10

## 2021-04-10 RX ORDER — HYDROCODONE BITARTRATE AND ACETAMINOPHEN 5; 325 MG/1; MG/1
1 TABLET ORAL EVERY 4 HOURS PRN
Status: DISCONTINUED | OUTPATIENT
Start: 2021-04-10 | End: 2021-04-11 | Stop reason: HOSPADM

## 2021-04-10 RX ORDER — MAGNESIUM SULFATE HEPTAHYDRATE 40 MG/ML
2 INJECTION, SOLUTION INTRAVENOUS CONTINUOUS
Status: DISCONTINUED | OUTPATIENT
Start: 2021-04-10 | End: 2021-04-11 | Stop reason: ALTCHOICE

## 2021-04-10 RX ORDER — LABETALOL HYDROCHLORIDE 5 MG/ML
20-80 INJECTION, SOLUTION INTRAVENOUS PRN
Status: DISCONTINUED | OUTPATIENT
Start: 2021-04-10 | End: 2021-04-10

## 2021-04-10 RX ORDER — SODIUM CHLORIDE, SODIUM LACTATE, POTASSIUM CHLORIDE, CALCIUM CHLORIDE 600; 310; 30; 20 MG/100ML; MG/100ML; MG/100ML; MG/100ML
INJECTION, SOLUTION INTRAVENOUS PRN
Status: DISCONTINUED | OUTPATIENT
Start: 2021-04-10 | End: 2021-04-11 | Stop reason: HOSPADM

## 2021-04-10 RX ORDER — MAGNESIUM SULFATE HEPTAHYDRATE 40 MG/ML
4 INJECTION, SOLUTION INTRAVENOUS ONCE
Status: COMPLETED | OUTPATIENT
Start: 2021-04-10 | End: 2021-04-10

## 2021-04-10 RX ORDER — IBUPROFEN 600 MG/1
600 TABLET ORAL EVERY 6 HOURS PRN
Status: DISCONTINUED | OUTPATIENT
Start: 2021-04-10 | End: 2021-04-10

## 2021-04-10 RX ORDER — ONDANSETRON 4 MG/1
4 TABLET, ORALLY DISINTEGRATING ORAL EVERY 6 HOURS PRN
Status: DISCONTINUED | OUTPATIENT
Start: 2021-04-10 | End: 2021-04-11 | Stop reason: HOSPADM

## 2021-04-10 RX ORDER — DOCUSATE SODIUM 100 MG/1
100 CAPSULE, LIQUID FILLED ORAL 2 TIMES DAILY PRN
Status: DISCONTINUED | OUTPATIENT
Start: 2021-04-10 | End: 2021-04-11 | Stop reason: HOSPADM

## 2021-04-10 RX ORDER — MISOPROSTOL 200 UG/1
600 TABLET ORAL
Status: DISCONTINUED | OUTPATIENT
Start: 2021-04-10 | End: 2021-04-11 | Stop reason: HOSPADM

## 2021-04-10 RX ORDER — VITAMIN A ACETATE, BETA CAROTENE, ASCORBIC ACID, CHOLECALCIFEROL, .ALPHA.-TOCOPHEROL ACETATE, DL-, THIAMINE MONONITRATE, RIBOFLAVIN, NIACINAMIDE, PYRIDOXINE HYDROCHLORIDE, FOLIC ACID, CYANOCOBALAMIN, CALCIUM CARBONATE, FERROUS FUMARATE, ZINC OXIDE, CUPRIC OXIDE 3080; 12; 120; 400; 1; 1.84; 3; 20; 22; 920; 25; 200; 27; 10; 2 [IU]/1; UG/1; MG/1; [IU]/1; MG/1; MG/1; MG/1; MG/1; MG/1; [IU]/1; MG/1; MG/1; MG/1; MG/1; MG/1
1 TABLET, FILM COATED ORAL EVERY MORNING
Status: DISCONTINUED | OUTPATIENT
Start: 2021-04-10 | End: 2021-04-11 | Stop reason: HOSPADM

## 2021-04-10 RX ORDER — METHYLERGONOVINE MALEATE 0.2 MG/ML
0.2 INJECTION INTRAVENOUS
Status: DISCONTINUED | OUTPATIENT
Start: 2021-04-10 | End: 2021-04-11 | Stop reason: HOSPADM

## 2021-04-10 RX ADMIN — HYDROCODONE BITARTRATE AND ACETAMINOPHEN 1 TABLET: 5; 325 TABLET ORAL at 04:00

## 2021-04-10 RX ADMIN — ONDANSETRON 4 MG: 2 INJECTION INTRAMUSCULAR; INTRAVENOUS at 05:10

## 2021-04-10 RX ADMIN — ONDANSETRON 4 MG: 2 INJECTION INTRAMUSCULAR; INTRAVENOUS at 04:54

## 2021-04-10 RX ADMIN — Medication 2000 ML/HR: at 03:15

## 2021-04-10 RX ADMIN — MAGNESIUM SULFATE IN WATER 4 G: 40 INJECTION, SOLUTION INTRAVENOUS at 04:34

## 2021-04-10 RX ADMIN — MAGNESIUM SULFATE HEPTAHYDRATE 2 G/HR: 40 INJECTION, SOLUTION INTRAVENOUS at 05:15

## 2021-04-10 RX ADMIN — LABETALOL HYDROCHLORIDE 20 MG: 5 INJECTION, SOLUTION INTRAVENOUS at 03:50

## 2021-04-10 RX ADMIN — IBUPROFEN 600 MG: 600 TABLET, FILM COATED ORAL at 07:43

## 2021-04-10 RX ADMIN — MISOPROSTOL 800 MCG: 200 TABLET ORAL at 04:34

## 2021-04-10 RX ADMIN — SODIUM CHLORIDE, POTASSIUM CHLORIDE, SODIUM LACTATE AND CALCIUM CHLORIDE: 600; 310; 30; 20 INJECTION, SOLUTION INTRAVENOUS at 16:49

## 2021-04-10 RX ADMIN — Medication 125 ML/HR: at 03:46

## 2021-04-10 RX ADMIN — LABETALOL HYDROCHLORIDE 40 MG: 5 INJECTION, SOLUTION INTRAVENOUS at 04:00

## 2021-04-10 ASSESSMENT — EDINBURGH POSTNATAL DEPRESSION SCALE (EPDS)
I HAVE FELT SCARED OR PANICKY FOR NO GOOD REASON: NO, NOT AT ALL
THINGS HAVE BEEN GETTING ON TOP OF ME: NO, MOST OF THE TIME I HAVE COPED QUITE WELL
I HAVE BLAMED MYSELF UNNECESSARILY WHEN THINGS WENT WRONG: YES, SOME OF THE TIME
I HAVE BEEN SO UNHAPPY THAT I HAVE HAD DIFFICULTY SLEEPING: NOT VERY OFTEN
THE THOUGHT OF HARMING MYSELF HAS OCCURRED TO ME: NEVER
I HAVE FELT SAD OR MISERABLE: NO, NOT AT ALL
I HAVE BEEN ANXIOUS OR WORRIED FOR NO GOOD REASON: HARDLY EVER
I HAVE BEEN ABLE TO LAUGH AND SEE THE FUNNY SIDE OF THINGS: AS MUCH AS I ALWAYS COULD
I HAVE LOOKED FORWARD WITH ENJOYMENT TO THINGS: AS MUCH AS I EVER DID
I HAVE BEEN SO UNHAPPY THAT I HAVE BEEN CRYING: NO, NEVER

## 2021-04-10 ASSESSMENT — PAIN DESCRIPTION - PAIN TYPE
TYPE: ACUTE PAIN
TYPE: ACUTE PAIN

## 2021-04-10 ASSESSMENT — FIBROSIS 4 INDEX: FIB4 SCORE: 0.33

## 2021-04-10 NOTE — L&D DELIVERY NOTE
"Delivery Summary    Tabbatha Reyes Courser is a 24 yo  who presented at 37w5d in active labor dilated to 9cm. She progressed rapidly to complete dilation and with pushing efforts she underwent a spontaneous vaginal delivery of a viable male infant \"Daniel\" in NIC position with APGARS 8/9. The head delivered atraumatically. The anterior shoulder delivered with gentle downward pressure and the remainder of the body followed. The infant was placed on the maternal chest. The cord was clamped and cut after a 30 second delay. Pitocin was administered. The placenta was delivered with gentle pressure. The uterus firmed with fundal pressure and pitocin. The perineum was examined and there were no lacerations.     EBL: 300cc  Anesthesia: none  Complications: None    Edilberto Mccoy M.D.      "

## 2021-04-10 NOTE — PROGRESS NOTES
0700 - Report received from SYLVESTER Moore RN. POC discussed and questions addressed. Will monitor.  0845 - MD notified of trending BPs, per MD keep on L&D and monitor for a few hours.  1045- MD notified of BPs in normal range x2 hours, ok to transfer to .  1100 - Pt up to wheelchair, transferred in apparent stable condition with infant. Report to ZENAIDA Connell RN.

## 2021-04-10 NOTE — PROGRESS NOTES
0240 Received report from LAUREEN Main discussed, Care assumed.     0309  of viable male infant, 8/8 apgars, 3VC.     0313 - Spontaneous delivery of intact placenta.    0337 two consecutive high blood pressures in a row MD Mccoy notified, orders given to start hypertensive protocol.     0410 After two doses of Labetalol (see MAR) pt blood pressure still unchanged. MD Mccoy notified, Orders given to start Magnesium (see MAR).    0700 Report given to Monica HERNANDEZ, POC discussed. Pt is in stable condition.

## 2021-04-10 NOTE — PROGRESS NOTES
Patient arrived to room 303 at 1100. Report received from MARY Anderson. Educated patient on cuddles system, emergency pull cord, and infant feeding frequency. Call light within reach.

## 2021-04-10 NOTE — PROGRESS NOTES
OB Progress Note    Patient with persistent severe range blood pressures requiring two doses of IV labetalol to stabilize.Concern for preeclampsia with severe features as is evidenced by severe range pressures.  Discussed r/b/side effects to magnesium for seizure prophylaxis. Patient states understanding.

## 2021-04-10 NOTE — H&P
"Labor and Delivery History and Physical    CC: contractions    HPI: Tabbatha Reyes Courser is a 24 yo  who presented at 37w5d in active labor, dilated to 9cm. Blood pressures elevated upon presentation and post delivery. She denied symptoms of preeclampsia.     She received her prenatal care with Dr Martinez this pregnancy. It was uncomplicated. She had a normal anatomic scan at 19wks demonstrating a male fetus. She declined genetic screening this pregnancy.     All other systems were reviewed and were negative.    PMH:asthma  PSH:lung tumor removal as an infant  OB HX: 1 EAB, 1 SAB, 1 term vaginal delivery of a viable male infant  Gyn Hx:prior chlamydia treated; denies HSV  SH:denies T/E/D  Meds:PNV  Allergies:NKDA    Ht 1.6 m (5' 3\")   Wt 77.1 kg (170 lb)   LMP 2020   BMI 30.11 kg/m²     Physical Exam  Gen: NAD  Abd: gravid, non tender  Ext: no edema    FHT:baseline 100's, moderate variability, decelerations to 90's present  [place]:2 min  SVE:C/C/+1    Laboratory Evaluation  ABO: A pos  GBS: neg  GTT:123  Rubella: Immune  HIV: Neg  RPR: NR  Hep sAg: neg      Assessment:   1. Term IUP at 37w5d  2. Active labor  3. Elevated blood pressure    Plan:  S/p  viable male infant. See delivery note. Preeclampsia labs drawn. BP monitoring, if persistent severe range plan to treat with antihypertensive protocol.     Edilberto Mccoy M.D.    Edilberto Mccoy M.D.      "

## 2021-04-11 VITALS
HEART RATE: 68 BPM | TEMPERATURE: 97.6 F | DIASTOLIC BLOOD PRESSURE: 96 MMHG | WEIGHT: 170 LBS | OXYGEN SATURATION: 100 % | BODY MASS INDEX: 30.12 KG/M2 | HEIGHT: 63 IN | SYSTOLIC BLOOD PRESSURE: 139 MMHG | RESPIRATION RATE: 18 BRPM

## 2021-04-11 LAB — MAGNESIUM SERPL-MCNC: 6.3 MG/DL (ref 1.5–2.5)

## 2021-04-11 PROCEDURE — 83735 ASSAY OF MAGNESIUM: CPT

## 2021-04-11 PROCEDURE — 36415 COLL VENOUS BLD VENIPUNCTURE: CPT

## 2021-04-11 PROCEDURE — 700111 HCHG RX REV CODE 636 W/ 250 OVERRIDE (IP): Performed by: OBSTETRICS & GYNECOLOGY

## 2021-04-11 RX ADMIN — MAGNESIUM SULFATE HEPTAHYDRATE 2 G/HR: 40 INJECTION, SOLUTION INTRAVENOUS at 00:31

## 2021-04-11 ASSESSMENT — PAIN DESCRIPTION - PAIN TYPE
TYPE: ACUTE PAIN

## 2021-04-11 NOTE — PROGRESS NOTES
Progress Note        Subjective: Patient is doing well lochia is normal.    Objective: Vital signs are normal  General: Patient's awake alert pleasant  Head: Atraumatic normocephalic  Abdomen: Fundus is firm nontender    Assessment:  Postpartum day 1 vaginal delivery    Plan:  Patient will be discharged home  She is to follow-up in the office this week for a blood pressure check  Infectious and bleeding precautions reviewed  Patient to take prenatal vitamin while breast-feeding  Patient will obtain a blood pressure cuff and check her blood pressures at home.  We discussed there can be a rebound in blood pressures.  She was instructed to check her blood pressures 2-3 times a day.  If her systolic blood pressures greater than 150, or diastolic blood pressure greater than 100 she is to call us.      Kath Lopez MD

## 2021-04-11 NOTE — PROGRESS NOTES
Took report Dilshad HERNANDEZ. Assumed patient care. Patient assessed VS stable. Pain reported as 0/10.  Breasts soft. Fundus firm U-1., lochia scant rubra. Legs show no signs of swelling, heat, redness, pain. All questions and concerns answered at this time. Bed rails up x 2. Call light in reach. Patient belongings in reach. Will continue to monitor.

## 2021-04-11 NOTE — PROGRESS NOTES
Skinny from Lab called with critical result of 6.3 at 0143. Critical lab result read back to Skinny.   This critical lab result is within parameters established by  for this patient. Patient on IV magnesium.

## 2021-04-11 NOTE — CARE PLAN
Problem: Altered physiologic condition related to immediate post-delivery state and potential for bleeding/hemorrhage  Goal: Patient physiologically stable as evidenced by normal lochia, palpable uterine involution and vital signs within normal limits  Outcome: PROGRESSING AS EXPECTED  Note: Patient has scant lochia rubra, and no large clots. Uterus firm.     Problem: Alteration in comfort related to episiotomy, vaginal repair and/or after birth pains  Goal: Patient verbalizes acceptable pain level  Outcome: PROGRESSING AS EXPECTED  Note: Patient able to verbalize needs for pain medication as needed.

## 2021-04-11 NOTE — PROGRESS NOTES
0700 Received bedside report from MARY Cortés. Discussed plan of care. Patient will call for pain medication as needed. All needs met at this time.

## 2021-04-11 NOTE — PROGRESS NOTES
Skinny from Lab called with critical result of 6.1 at 1957. Critical lab result read back to Skinny.   This critical lab result is within parameters established by  for this patient. Patient on IV magnesium.

## 2021-04-11 NOTE — DISCHARGE INSTRUCTIONS
PATIENT DISCHARGE EDUCATION INSTRUCTION SHEET  REASONS TO CALL YOUR OBSTETRICIAN  · Persistent fever, shaking, chills (Temperature higher than 100.4) may indicate you have an infection  · Heavy bleeding: soaking more than 1 pad per hour; Passing clots an egg-sized clot or bigger may mean you have an postpartum hemorrhage  · Foul odor from vagina or bad smelling or discolored discharge or blood  · Breast infection (Mastitis symptoms); breast pain, chills, fever, redness or red streaks, may feel flu like symptoms  · Urinary pain, burning or frequency  · Incision that is not healing, increased redness, swelling, tenderness or pain, or any pus from episiotomy or  site may mean you have an infection  · Redness, swelling, warmth, or painful to touch in the calf area of your leg may mean you have a blood clot  · Severe or intensified depression, thoughts or feelings of wanting to hurt yourself or someone else   · Pain in chest, obstructed breathing or shortness of breath (trouble catching your breath) may mean you are having a postpartum complication. Call your provider immediately   · Headache that does not get better, even after taking medicine, a bad headache with vision changes or pain in the upper right area of your belly may mean you have high blood pressure or post birth preeclampsia. Call your provider immediately    HAND WASHING  All family and friends should wash their hands:  · Before and after holding the baby  · Before feeding the baby  · After using the restroom or changing the baby's diaper    WOUND CARE  Ask your physician for additional care instructions. In general:  ·  Incision:  · May shower and pat incision dry   · Keep the incision clean and dry  · There should not be any opening or pus from the incision  · Continue to walk at home 3 times a day   · Do NOT lift anything heavier than your baby (over 10 pounds)  · Encourage family to help participate in care of the  to allow  rest and mom time to heal  · Episiotomy/Laceration  · May use juan-spray bottle, witch hazel pads and dermaplast spray for comfort  · Use juan-spray bottle after urinating to cleanse perineal area  · To prevent burning during urination spray juan-water bottle on labial area   · Pat perineal area dry until episiotomy/laceration is healed  · Continue to use juan-bottle until bleeding stops as needed  · If have a 2nd degree laceration or greater, a Sitz bath can offer relief from soreness, burning, and inflammation   · Sitz Bath   · Sit in 6 inches of warm water and soak laceration as needed until the laceration heals    VAGINAL CARE AND BLEEDING  · Nothing inside vagina for 6 weeks:   · No sexual intercourse, tampons or douching  · Bleeding may continue for 2-4 weeks. Amount and color may vary  · Soaking 1 pad or more in an hour for several hours is considered heavy bleeding  · Passing large egg sized blood clots can be concerning  · If you feel like you have heavy bleeding or are having increasing amount of blood clots call your Obstetrician immediately  · If you begin feeling faint upon standing, feeling sick to your stomach, have clammy skin, a really fast heartbeat, have chills, start feeling confused, dizzy, sleepy or weak, or feeling like you're going to faint call your Obstetrician immediately    HYPERTENSION   Preeclampsia or gestational hypertension are types of high blood pressure that only pregnant women can get. It is important for you to be aware of symptoms to seek early intervention and treatment. If you have any of these symptoms immediately call your Obstetrician    · Vision changes or blurred vision   · Severe headache or pain that is unrelieved with medication and will not go away  · Persistent pain in upper abdomen or shoulder   · Increased swelling of face, feet, or hands  · Difficulty breathing or shortness of breath at rest  · Urinating less than usual    URINATION AND BOWEL MOVEMENTS  · Eating  "more fiber (bran cereal, fruits, and vegetables) and drinking plenty of fluids will help to avoid constipation  · Urinary frequency and urgency after childbirth is normal  · If you experience any urinary pain, burning or frequency call your provider    BIRTH CONTROL  · It is possible to become pregnant at any time after delivery and while breastfeeding  · Plan to discuss a method of birth control with your physician at your post delivery follow up visit    POSTPARTUM BLUES  During the first few days after birth, you may experience a sense of the \"blues\" which may include impatience, irritability or even crying. These feelings come and go quickly. However, as many as 1 in 10 women experience emotional symptoms known as postpartum depression.     POSTPARTUM DEPRESSION    May start as early as the second or third day after delivery or take several weeks or months to develop. Symptoms of \"blues\" are present, but are more intense: Crying spells; loss of appetite; feelings of hopelessness or loss of control; fear of touching the baby; over concern or no concern at all about the baby; little or no concern about your own appearance/caring for yourself; and/or inability to sleep or excessive sleeping. Contact your Obstetrician if you are experiencing any of these symptoms     PREVENTING SHAKEN BABY  If you are angry or stressed, PUT THE BABY IN THE CRIB, step away, take some deep breaths, and wait until you are calm to care for the baby. DO NOT SHAKE THE BABY. You are not alone, call a supporter for help.  · Crisis [facility] Center 24/7 crisis call line (440-476-5244) or (1-870.936.2786)  · You can also text them, text \"ANSWER\" (982762)      "

## 2024-09-26 ENCOUNTER — APPOINTMENT (OUTPATIENT)
Dept: RADIOLOGY | Facility: MEDICAL CENTER | Age: 29
End: 2024-09-26
Attending: EMERGENCY MEDICINE
Payer: COMMERCIAL

## 2024-09-26 ENCOUNTER — HOSPITAL ENCOUNTER (EMERGENCY)
Facility: MEDICAL CENTER | Age: 29
End: 2024-09-26
Attending: EMERGENCY MEDICINE
Payer: COMMERCIAL

## 2024-09-26 VITALS
HEIGHT: 63 IN | OXYGEN SATURATION: 97 % | SYSTOLIC BLOOD PRESSURE: 116 MMHG | TEMPERATURE: 98.4 F | HEART RATE: 70 BPM | DIASTOLIC BLOOD PRESSURE: 69 MMHG | RESPIRATION RATE: 15 BRPM | WEIGHT: 149.91 LBS | BODY MASS INDEX: 26.56 KG/M2

## 2024-09-26 DIAGNOSIS — R10.31 RLQ ABDOMINAL PAIN: ICD-10-CM

## 2024-09-26 DIAGNOSIS — O00.101 RIGHT TUBAL PREGNANCY WITHOUT INTRAUTERINE PREGNANCY: ICD-10-CM

## 2024-09-26 DIAGNOSIS — O00.201 RIGHT OVARIAN PREGNANCY WITHOUT INTRAUTERINE PREGNANCY: ICD-10-CM

## 2024-09-26 LAB
ALBUMIN SERPL BCP-MCNC: 4.2 G/DL (ref 3.2–4.9)
ALBUMIN/GLOB SERPL: 1.8 G/DL
ALP SERPL-CCNC: 60 U/L (ref 30–99)
ALT SERPL-CCNC: 11 U/L (ref 2–50)
ANION GAP SERPL CALC-SCNC: 12 MMOL/L (ref 7–16)
APPEARANCE UR: CLEAR
AST SERPL-CCNC: 15 U/L (ref 12–45)
B-HCG SERPL-ACNC: 230 MIU/ML (ref 0–5)
BACTERIA #/AREA URNS HPF: ABNORMAL /HPF
BASOPHILS # BLD AUTO: 0.6 % (ref 0–1.8)
BASOPHILS # BLD: 0.04 K/UL (ref 0–0.12)
BILIRUB SERPL-MCNC: 0.3 MG/DL (ref 0.1–1.5)
BILIRUB UR QL STRIP.AUTO: NEGATIVE
BUN SERPL-MCNC: 9 MG/DL (ref 8–22)
CALCIUM ALBUM COR SERPL-MCNC: 9.4 MG/DL (ref 8.5–10.5)
CALCIUM SERPL-MCNC: 9.6 MG/DL (ref 8.5–10.5)
CANDIDA DNA VAG QL PROBE+SIG AMP: NEGATIVE
CHLORIDE SERPL-SCNC: 107 MMOL/L (ref 96–112)
CO2 SERPL-SCNC: 21 MMOL/L (ref 20–33)
COLOR UR: YELLOW
CREAT SERPL-MCNC: 0.57 MG/DL (ref 0.5–1.4)
EOSINOPHIL # BLD AUTO: 0.1 K/UL (ref 0–0.51)
EOSINOPHIL NFR BLD: 1.4 % (ref 0–6.9)
EPI CELLS #/AREA URNS HPF: ABNORMAL /HPF
ERYTHROCYTE [DISTWIDTH] IN BLOOD BY AUTOMATED COUNT: 38.4 FL (ref 35.9–50)
G VAGINALIS DNA VAG QL PROBE+SIG AMP: POSITIVE
GFR SERPLBLD CREATININE-BSD FMLA CKD-EPI: 128 ML/MIN/1.73 M 2
GLOBULIN SER CALC-MCNC: 2.3 G/DL (ref 1.9–3.5)
GLUCOSE SERPL-MCNC: 94 MG/DL (ref 65–99)
GLUCOSE UR STRIP.AUTO-MCNC: NEGATIVE MG/DL
HCT VFR BLD AUTO: 38.9 % (ref 37–47)
HGB BLD-MCNC: 13.7 G/DL (ref 12–16)
HYALINE CASTS #/AREA URNS LPF: ABNORMAL /LPF
IMM GRANULOCYTES # BLD AUTO: 0.03 K/UL (ref 0–0.11)
IMM GRANULOCYTES NFR BLD AUTO: 0.4 % (ref 0–0.9)
KETONES UR STRIP.AUTO-MCNC: NEGATIVE MG/DL
LEUKOCYTE ESTERASE UR QL STRIP.AUTO: ABNORMAL
LIPASE SERPL-CCNC: 34 U/L (ref 11–82)
LYMPHOCYTES # BLD AUTO: 2.74 K/UL (ref 1–4.8)
LYMPHOCYTES NFR BLD: 38.4 % (ref 22–41)
MCH RBC QN AUTO: 30.7 PG (ref 27–33)
MCHC RBC AUTO-ENTMCNC: 35.2 G/DL (ref 32.2–35.5)
MCV RBC AUTO: 87.2 FL (ref 81.4–97.8)
MICRO URNS: ABNORMAL
MONOCYTES # BLD AUTO: 0.37 K/UL (ref 0–0.85)
MONOCYTES NFR BLD AUTO: 5.2 % (ref 0–13.4)
NEUTROPHILS # BLD AUTO: 3.86 K/UL (ref 1.82–7.42)
NEUTROPHILS NFR BLD: 54 % (ref 44–72)
NITRITE UR QL STRIP.AUTO: NEGATIVE
NRBC # BLD AUTO: 0 K/UL
NRBC BLD-RTO: 0 /100 WBC (ref 0–0.2)
NUMBER OF RH DOSES IND 8505RD: NORMAL
PH UR STRIP.AUTO: 6.5 [PH] (ref 5–8)
PLATELET # BLD AUTO: 256 K/UL (ref 164–446)
PMV BLD AUTO: 9 FL (ref 9–12.9)
POTASSIUM SERPL-SCNC: 3.8 MMOL/L (ref 3.6–5.5)
PROGEST SERPL-MCNC: 0.8 NG/ML
PROT SERPL-MCNC: 6.5 G/DL (ref 6–8.2)
PROT UR QL STRIP: NEGATIVE MG/DL
RBC # BLD AUTO: 4.46 M/UL (ref 4.2–5.4)
RBC # URNS HPF: ABNORMAL /HPF
RBC UR QL AUTO: ABNORMAL
RH BLD: NORMAL
SODIUM SERPL-SCNC: 140 MMOL/L (ref 135–145)
SP GR UR STRIP.AUTO: 1.01
T VAGINALIS DNA VAG QL PROBE+SIG AMP: NEGATIVE
UROBILINOGEN UR STRIP.AUTO-MCNC: 0.2 MG/DL
WBC # BLD AUTO: 7.1 K/UL (ref 4.8–10.8)
WBC #/AREA URNS HPF: ABNORMAL /HPF

## 2024-09-26 PROCEDURE — 80053 COMPREHEN METABOLIC PANEL: CPT

## 2024-09-26 PROCEDURE — 84144 ASSAY OF PROGESTERONE: CPT

## 2024-09-26 PROCEDURE — 96401 CHEMO ANTI-NEOPL SQ/IM: CPT

## 2024-09-26 PROCEDURE — 87510 GARDNER VAG DNA DIR PROBE: CPT

## 2024-09-26 PROCEDURE — 81001 URINALYSIS AUTO W/SCOPE: CPT

## 2024-09-26 PROCEDURE — 36415 COLL VENOUS BLD VENIPUNCTURE: CPT

## 2024-09-26 PROCEDURE — 85025 COMPLETE CBC W/AUTO DIFF WBC: CPT

## 2024-09-26 PROCEDURE — 99285 EMERGENCY DEPT VISIT HI MDM: CPT

## 2024-09-26 PROCEDURE — 87480 CANDIDA DNA DIR PROBE: CPT

## 2024-09-26 PROCEDURE — 84702 CHORIONIC GONADOTROPIN TEST: CPT

## 2024-09-26 PROCEDURE — 700111 HCHG RX REV CODE 636 W/ 250 OVERRIDE (IP): Mod: JZ,UD | Performed by: OBSTETRICS & GYNECOLOGY

## 2024-09-26 PROCEDURE — 83690 ASSAY OF LIPASE: CPT

## 2024-09-26 PROCEDURE — 86901 BLOOD TYPING SEROLOGIC RH(D): CPT

## 2024-09-26 PROCEDURE — 99283 EMERGENCY DEPT VISIT LOW MDM: CPT | Performed by: OBSTETRICS & GYNECOLOGY

## 2024-09-26 PROCEDURE — 87660 TRICHOMONAS VAGIN DIR PROBE: CPT

## 2024-09-26 RX ADMIN — METHOTREXATE 87 MG: 25 INJECTION INTRA-ARTERIAL; INTRAMUSCULAR; INTRATHECAL; INTRAVENOUS at 21:47

## 2024-09-27 NOTE — PROGRESS NOTES
"Pharmacy Methotrexate for Ectopic Pregnancy Calculation Note       Wt 68 kg Ht 63 in  BSA ([place]) 1.74 m2    Pertinent Labs:  SCr 0.57  T. Bili 0.3, AST 15, ALT 11  Rh Status pos    Dx: Ectopic Pregnancy  Usual dose: Methotrexate 50 mg/m2 IM x 1 dose   Reference:   Jean MORA, \"Clinical Practice. Ectopic Pregnancy,\" N Engl J Med, 2009, 361(4):379-87.      Calculations:  Methotrexate 50 mg/m2 x 1.74 m2 = 87 mg  Final Dose = 87 mg IM (split into 2 syringes of 43.5 mg per syringe)  Final Volume in syringe using Methotrexate 25 mg/mL injection = 3.48 mL (split into 2 syringes of 1.74 mL per syringe)    Derrell Coyle, PharmD     "

## 2024-09-27 NOTE — CONSULTS
Consultation      History and Physical    Request physician : Nader Vuonga Reyes is a 28 y.o. female  5 para 2-0-2-2 at unknown gestational age who was seen at [facility].  hCG levels and ultrasound suspicious for possible ectopic pregnancy.  Large separate right sided mass noted on right adnexal area, distinct from ovary and uterus.  First hCG drawn on 923 was 412.  Second hCG drawn on 925 was 295.  hCG today 230.  She reports some right lower quadrant pain, approximately 3 out of 10 in intensity.  Some vaginal spotting as well.    History of 2 vaginal deliveries, 1 miscarriage and 1 prior elective termination.    ROS: Pertinent positives documented in HPI and all other systems reviewed & are negative    OB History    Para Term  AB Living   3 2 2     2   SAB IAB Ectopic Molar Multiple Live Births           0 2      # Outcome Date GA Lbr David/2nd Weight Sex Type Anes PTL Lv   3 Current            2 Term 04/10/21 37w5d / 00:09 2.985 kg (6 lb 9.3 oz) M Vag-Spont None N KRISTIE   1 Term 19 39w1d 07:55 / 00:20 3.41 kg (7 lb 8.3 oz) M Vag-Spont EPI N KRISTIE       PGYNHx: None    History reviewed. No pertinent past medical history.    Past Surgical History:   Procedure Laterality Date    CYSTECTOMY      lung at 3 months old       Current Facility-Administered Medications:     methotrexate PF 87 mg in syringe 3.48 mL Chemotherapy, 50 mg/m2, Intramuscular, Once, Michael Acosta M.D.  No current outpatient medications on file.    Allergies: Patient has no known allergies.    Social History     Socioeconomic History    Marital status: Single     Spouse name: Not on file    Number of children: Not on file    Years of education: Not on file    Highest education level: Not on file   Occupational History    Not on file   Tobacco Use    Smoking status: Never    Smokeless tobacco: Never   Vaping Use    Vaping status: Some Days    Substances: THC   Substance and Sexual Activity     "Alcohol use: No     Alcohol/week: 0.0 oz    Drug use: Yes     Types: Marijuana, Inhaled    Sexual activity: Yes     Partners: Male   Other Topics Concern    Not on file   Social History Narrative    [employer] 11th grade    Grades are OK    Basketball      Social Determinants of Health     Financial Resource Strain: Not on file   Food Insecurity: Not on file   Transportation Needs: Not on file   Physical Activity: Not on file   Stress: Not on file   Social Connections: Not on file   Intimate Partner Violence: Not on file   Housing Stability: Not on file         FamHx: Noncontributory    [facility] care with [facility] with following problems:  Patient Active Problem List    Diagnosis Date Noted    Birth control 08/25/2015    Right knee pain 10/29/2014         Objective:      /70   Pulse 67   Temp 37.2 °C (98.9 °F) (Temporal)   Resp 14   Ht 1.6 m (5' 3\")   Wt 68 kg (149 lb 14.6 oz)   SpO2 97%     General:   no acute distress, alert, cooperative   Skin:   normal   HEENT:  EOMI   Lungs:   CTA bilateral   Heart:   chest is clear without rales or wheezing, no pedal edema, S1, S2 normal, no murmur, regular rate and rhythm   Abdomen:   soft, no guarding, no rebound.  No signs of acute abdomen   EFW:     Pelvis: Deferred                                              Lab Review  Lab:   Blood type: A     Recent Results (from the past 5880 hour(s))   CBC WITH DIFFERENTIAL    Collection Time: 09/26/24  5:09 PM   Result Value Ref Range    WBC 7.1 4.8 - 10.8 K/uL    RBC 4.46 4.20 - 5.40 M/uL    Hemoglobin 13.7 12.0 - 16.0 g/dL    Hematocrit 38.9 37.0 - 47.0 %    MCV 87.2 81.4 - 97.8 fL    MCH 30.7 27.0 - 33.0 pg    MCHC 35.2 32.2 - 35.5 g/dL    RDW 38.4 35.9 - 50.0 fL    Platelet Count 256 164 - 446 K/uL    MPV 9.0 9.0 - 12.9 fL    Neutrophils-Polys 54.00 44.00 - 72.00 %    Lymphocytes 38.40 22.00 - 41.00 %    Monocytes 5.20 0.00 - 13.40 %    Eosinophils 1.40 0.00 - 6.90 %    Basophils 0.60 0.00 - 1.80 %    Immature " Granulocytes 0.40 0.00 - 0.90 %    Nucleated RBC 0.00 0.00 - 0.20 /100 WBC    Neutrophils (Absolute) 3.86 1.82 - 7.42 K/uL    Lymphs (Absolute) 2.74 1.00 - 4.80 K/uL    Monos (Absolute) 0.37 0.00 - 0.85 K/uL    Eos (Absolute) 0.10 0.00 - 0.51 K/uL    Baso (Absolute) 0.04 0.00 - 0.12 K/uL    Immature Granulocytes (abs) 0.03 0.00 - 0.11 K/uL    NRBC (Absolute) 0.00 K/uL   COMP METABOLIC PANEL    Collection Time: 09/26/24  5:09 PM   Result Value Ref Range    Sodium 140 135 - 145 mmol/L    Potassium 3.8 3.6 - 5.5 mmol/L    Chloride 107 96 - 112 mmol/L    Co2 21 20 - 33 mmol/L    Anion Gap 12.0 7.0 - 16.0    Glucose 94 65 - 99 mg/dL    Bun 9 8 - 22 mg/dL    Creatinine 0.57 0.50 - 1.40 mg/dL    Calcium 9.6 8.5 - 10.5 mg/dL    Correct Calcium 9.4 8.5 - 10.5 mg/dL    AST(SGOT) 15 12 - 45 U/L    ALT(SGPT) 11 2 - 50 U/L    Alkaline Phosphatase 60 30 - 99 U/L    Total Bilirubin 0.3 0.1 - 1.5 mg/dL    Albumin 4.2 3.2 - 4.9 g/dL    Total Protein 6.5 6.0 - 8.2 g/dL    Globulin 2.3 1.9 - 3.5 g/dL    A-G Ratio 1.8 g/dL   LIPASE    Collection Time: 09/26/24  5:09 PM   Result Value Ref Range    Lipase 34 11 - 82 U/L   HCG QUANTITATIVE    Collection Time: 09/26/24  5:09 PM   Result Value Ref Range    Bhcg 230.0 (H) 0.0 - 5.0 mIU/mL   RH Type for Rhogam from E.D.    Collection Time: 09/26/24  5:09 PM   Result Value Ref Range    Emergency Department Rh Typing POS     Number Of Rh Doses Indicated ZERO    ESTIMATED GFR    Collection Time: 09/26/24  5:09 PM   Result Value Ref Range    GFR (CKD-EPI) 128 >60 mL/min/1.73 m 2   PROGESTERONE    Collection Time: 09/26/24  5:09 PM   Result Value Ref Range    Progesterone 0.80 ng/mL   URINALYSIS,CULTURE IF INDICATED    Collection Time: 09/26/24  5:15 PM    Specimen: Urine   Result Value Ref Range    Color Yellow     Character Clear     Specific Gravity 1.010 <1.035    Ph 6.5 5.0 - 8.0    Glucose Negative Negative mg/dL    Ketones Negative Negative mg/dL    Protein Negative Negative mg/dL     Bilirubin Negative Negative    Urobilinogen, Urine 0.2 Negative    Nitrite Negative Negative    Leukocyte Esterase Trace (A) Negative    Occult Blood Small (A) Negative    Micro Urine Req Microscopic    URINE MICROSCOPIC (W/UA)    Collection Time: 09/26/24  5:15 PM   Result Value Ref Range    WBC 0-2 /hpf    RBC 0-2 /hpf    Bacteria Few (A) None /hpf    Epithelial Cells Few /hpf    Hyaline Cast 0-2 /lpf        Assessment:   Tabbatha Reyes    Right ectopic pregnancy    Obstetrical history significant for   Patient Active Problem List    Diagnosis Date Noted    Birth control 08/25/2015    Right knee pain 10/29/2014   .     Plan:   Discussed with the patient options for management, including medical and surgical management.  The pros and cons of both were discussed with the patient.  Patient has opted to undergo medical management at this time.  No contraindications to methotrexate identified at this time.  Patient specifically denies any history of blood dyscrasia, renal disease, liver disease and she is not breast-feeding.    Discussed with with the patient importance of close follow-up.  She understands that she will need to have a blood draw 4 days from now and another one 7 days from now with a follow-up with [facility] in approximately 1 week.  She understands that although the majority of patients can be safely treated with methotrexate, rupture can unfortunately happen in any hCG level.  Precautions were once again discussed with the patient.    All questions answered

## 2024-09-27 NOTE — ED NOTES
Discharge teaching with paperwork regarding expected methotrexate administration associated symptoms and reasons to return to the ED provided to pt. Pt verbalized understanding of teaching and all questions answered. Pt is A&Ox4 with stable vital signs and stable physical assessment  upon discharge. Pt ambulatory out of ED with steady gait with all personal belongings.

## 2024-09-27 NOTE — ED NOTES
Chief Complaint   Patient presents with    Pregnancy     [facility] today pt had ultrasound done and was told she may have a ectopic pregnancy in her right ovari, pt states she has been having vaginal bleeding since aug 23rd until now     Agree with triage RN.

## 2024-09-27 NOTE — ED TRIAGE NOTES
"Chief Complaint   Patient presents with    Pregnancy     [facility], today pt had ultrasound done and was told she may have a ectopic pregnancy in her right ovari, pt states she has been having vaginal bleeding since aug 23rd until now     /85   Pulse (!) 104   Temp 37.2 °C (98.9 °F) (Temporal)   Resp 14   Ht 1.6 m (5' 3\")   Wt 68 kg (149 lb 14.6 oz)   SpO2 96%   BMI 26.56 kg/m²     "

## 2024-09-27 NOTE — ED PROVIDER NOTES
ED Provider Note    CHIEF COMPLAINT  Chief Complaint   Patient presents with    Pregnancy     [facility], today pt had ultrasound done and was told she may have a ectopic pregnancy in her right ovari, pt states she has been having vaginal bleeding since aug 23rd until now     EXTERNAL RECORDS REVIEWED  Recent emergent encounters, notes from 2021 patient was admitted for delivery    HPI/ROS  LIMITATION TO HISTORY   Select: : None  OUTSIDE HISTORIAN(S):  Sister-in-law at bedside.  Patient is primary historian    Tabbatha Reyes is a 28 y.o. female G5, P2 who presents emergency room for evaluation of ongoing vaginal bleeding.  The patient states she had her last period approximately 7/23, on 8/23 when she was due for her.  She began having some spotting at the normal rate that she was used to having her period.  Thereafter she continued to have some spotting and occasional passage of clots and this has been somewhat intermittent with some occasional right lower quadrant discomfort.  She had let this go for 3 weeks when she took a pregnancy test and saw that it was faintly positive.  Subsequently, patient has been [facility] at [facility], normal levels approximately in the 400s, she had a follow-up yesterday that showed hormone levels 300, ultrasound showed possible right ectopic pregnancy and was sent here for further rule out.    On my assessment the patient is having 5 out of 10 right lower quadrant discomfort, she says she is having some vaginal spotting, no vaginal discharge, no history of kidney stones, no history of ovarian cysts, no history of STI and says this feels is entirely different pain than the round ligament pain she had an early pregnancy before.      PAST MEDICAL HISTORY   none    SURGICAL HISTORY   has a past surgical history that includes cystectomy.    FAMILY HISTORY  Family History   Problem Relation Age of Onset    Psychiatric Illness Mother         bipolar     "Hypertension Father        SOCIAL HISTORY  Social History     Tobacco Use    Smoking status: Never    Smokeless tobacco: Never   Vaping Use    Vaping status: Some Days    Substances: THC   Substance and Sexual Activity    Alcohol use: No     Alcohol/week: 0.0 oz    Drug use: Yes     Types: Marijuana, Inhaled    Sexual activity: Yes     Partners: Male     CURRENT MEDICATIONS  Home Medications       Reviewed by Marc Cesar R.N. (Registered Nurse) on 09/26/24 at 1701  Med List Status: Not Addressed     Medication Last Dose Status        Patient Zane Taking any Medications                         ALLERGIES  No Known Allergies    PHYSICAL EXAM  VITAL SIGNS: /70   Pulse 67   Temp 37.2 °C (98.9 °F) (Temporal)   Resp 14   Ht 1.6 m (5' 3\")   Wt 68 kg (149 lb 14.6 oz)   SpO2 97%   BMI 26.56 kg/m²    Genl: F sitting in gurney uncomfortably, speaking clearly, appears anxious and in mild distress   Head: NC/AT   ENT: Mucous membranes moist, posterior pharynx clear, uvula midline, nares patent bilaterally   Eyes: Normal sclera, pupils equal round reactive to light  Neck: Supple, FROM, no LAD appreciated   Pulmonary: Lungs are clear to auscultation bilaterally  Chest: No TTP  CV:  RRR, no murmur appreciated  Abdomen: soft, lower quadrant discomfort with moderate palpation, no rigidity, ND; no rebound/guarding, no masses palpated, no HSM   : no CVA or suprapubic tenderness.  Normal external female genitalia.  Vaginal walls are unremarkable, os appears open with subtle oozing.  There is no purulence, there is no cervical motion tenderness.  Musculoskeletal: Pain free ROM of the neck. Moving upper and lower extremities in spontaneous and coordinated fashion  Neuro: A&Ox4 (person, place, time, situation), speech fluent, gait steady, no focal deficits appreciated  Skin: No rash or lesions.  No pallor or jaundice.  No cyanosis.  Warm and dry.     EKG/LABS  Labs Reviewed   HCG QUANTITATIVE - Abnormal; Notable for the " following components:       Result Value    Bhcg 230.0 (*)     All other components within normal limits   URINALYSIS,CULTURE IF INDICATED - Abnormal; Notable for the following components:    Leukocyte Esterase Trace (*)     Occult Blood Small (*)     All other components within normal limits   URINE MICROSCOPIC (W/UA) - Abnormal; Notable for the following components:    Bacteria Few (*)     All other components within normal limits   CBC WITH DIFFERENTIAL   COMP METABOLIC PANEL   LIPASE   RH TYPE FOR RHOGAM FROM E.D.   ESTIMATED GFR   PROGESTERONE   BACTERIAL VAGINOSIS/VAGINITIS PANEL     RADIOLOGY/PROCEDURES   Outside ultrasound is not available for immediate review.    COURSE & MEDICAL DECISION MAKING    ASSESSMENT, COURSE AND PLAN  Abnormal vaginal bleeding/pain in first trimester pregnancy: r/o threatened , inevitable , placental abruption, subchorionic hemorrhage, cervicitis, UTI, polyp, ectopic pregnancy, fetal demise, completed .    Care Narrative: Patient presents emergency room for symptoms as described above.  The patient arrives and has pain and discomfort, is anxious and overall her summary of presenting symptomology is expected.  Around  when she began having bleeding and is continued to have bleeding with positive pregnancy test.  Over the last week there is been downtrending hCG, localizing right lower quadrant pain and outside clinic hCG that is downward but not completely ablating.  With the ultrasound showing ectopic she was sent here and during my initial assessment she is hemodynamically stable, while she was tachycardic this resolved with no further interventions and plan would be for n.p.o. status upon confirmation of intra-abdominal bleeding source or ectopic rule out.  I was notified that Dr. Acosta has come to the bedside.    Plan is for admission to the OB service, currently undergoing discussion about medical management versus surgical  intervention..    Currently, patient does not have leukocytosis, no concerning anemia, no endorgan dysfunction, kidney injury, liver enzyme elevations and most recent hCG is 230.  She is rho positive and does not require any RhoGAM administration, there is no concurrent UTI or stone pathology.  Vaginal pathogen's panel is in process     1930: Discussed with OB and gynecology service, current plan is for medical management as this is the patient's wishes.  He is placed an order for methotrexate, current plan is for administration of this by the chemotherapy nurse, watch for 20 minutes and follow-up on Sunday with repeat hCG testing.    Strict return precautions have been discussed regarding emergent reevaluation here in the emergency room.  To discharge the patient's questions have been addressed.  She understands about follow-up and discussion with OB gynecology, the need for reassessment of condition worsens    FINAL DIAGNOSIS  1. RLQ abdominal pain    2. Right ovarian pregnancy without intrauterine pregnancy      Electronically signed by: Gavin Doe M.D., 9/26/2024 6:25 PM

## 2024-09-29 ENCOUNTER — HOSPITAL ENCOUNTER (EMERGENCY)
Facility: MEDICAL CENTER | Age: 29
End: 2024-09-29
Attending: EMERGENCY MEDICINE
Payer: COMMERCIAL

## 2024-09-29 VITALS
OXYGEN SATURATION: 98 % | TEMPERATURE: 98 F | HEART RATE: 75 BPM | BODY MASS INDEX: 26.4 KG/M2 | DIASTOLIC BLOOD PRESSURE: 88 MMHG | RESPIRATION RATE: 16 BRPM | HEIGHT: 63 IN | WEIGHT: 149 LBS | SYSTOLIC BLOOD PRESSURE: 130 MMHG

## 2024-09-29 DIAGNOSIS — O00.90 ECTOPIC PREGNANCY WITHOUT INTRAUTERINE PREGNANCY, UNSPECIFIED LOCATION: ICD-10-CM

## 2024-09-29 LAB — B-HCG SERPL-ACNC: 185 MIU/ML (ref 0–5)

## 2024-09-29 PROCEDURE — 99283 EMERGENCY DEPT VISIT LOW MDM: CPT

## 2024-09-29 PROCEDURE — 84702 CHORIONIC GONADOTROPIN TEST: CPT

## 2024-09-29 PROCEDURE — 36415 COLL VENOUS BLD VENIPUNCTURE: CPT

## 2024-09-29 ASSESSMENT — PAIN DESCRIPTION - PAIN TYPE: TYPE: ACUTE PAIN

## 2024-09-29 ASSESSMENT — FIBROSIS 4 INDEX: FIB4 SCORE: 0.46

## 2024-09-29 NOTE — ED NOTES
Patient Identifiers verified; patient ambulated to room with a steady gait; charted the Patient for an Emergency Room Physician to see.

## 2024-09-29 NOTE — ED PROVIDER NOTES
"ER Provider Note    Scribed for Alexandro Westbrook M.d. by Kath Sanchez. 9/29/2024  12:10 PM    Primary Care Provider: Mary Lion M.D.    CHIEF COMPLAINT   Chief Complaint   Patient presents with    Other     Pt to triage, pt reports she has an ectopic pregnancy. Had lab work drawn 3 days ago and was told to come back for a redraw.      EXTERNAL RECORDS REVIEWED  External ED Note ectopic pregnancy identified September 26 by Dr. Head and was told to repeat eats hCG today    HPI/ROS  LIMITATION TO HISTORY   Select: : None  OUTSIDE HISTORIAN(S):  None    Tabbatha Reyes is a 28 y.o. female who presents to the ED for repeat HCG. Patient states she had an ectopic pregnancy and had her dose of methotrexate on Thursday evening. She reports she is not following with anyone for OB. She has a follow up appointment with OB on Wednesday. No additional pain or symptoms noted at this time. No known drug allergies.     PAST MEDICAL HISTORY  History reviewed. No pertinent past medical history.    SURGICAL HISTORY  Past Surgical History:   Procedure Laterality Date    CYSTECTOMY      lung at 3 months old     FAMILY HISTORY  Family History   Problem Relation Age of Onset    Psychiatric Illness Mother         bipolar    Hypertension Father        SOCIAL HISTORY   reports that she has never smoked. She has never used smokeless tobacco. She reports current drug use. Drugs: Marijuana and Inhaled. She reports that she does not drink alcohol.    CURRENT MEDICATIONS  No current outpatient medications    ALLERGIES  Patient has no known allergies.    PHYSICAL EXAM  /81   Pulse 66   Temp 36.2 °C (97.1 °F) (Temporal)   Resp 16   Ht 1.6 m (5' 3\")   Wt 67.6 kg (149 lb)   SpO2 97%   BMI 26.39 kg/m²   Constitutional: Well developed, Well nourished, No acute distress, Non-toxic appearance.   HENT: Normocephalic, Atraumatic, Bilateral external ears normal, Oropharynx is clear mucous membranes are moist. No oral exudates or nasal discharge. "   Eyes: Pupils are equal round and reactive, EOMI, Conjunctiva normal, No discharge.   Neck: Normal range of motion, No tenderness, Supple, No stridor. No meningismus.  Lymphatic: No lymphadenopathy noted.   Cardiovascular: Regular rate and rhythm without murmur rub or gallop.  Thorax & Lungs: Clear breath sounds bilaterally without wheezes, rhonchi or rales. There is no chest wall tenderness.   Abdomen: Soft non-tender non-distended. There is no rebound or guarding. No organomegaly is appreciated. Bowel sounds are normal.  Skin: Normal without rash.   Back: No CVA or spinal tenderness.   Extremities: Intact distal pulses, No edema, No tenderness, No cyanosis, No clubbing. Capillary refill is less than 2 seconds.  Musculoskeletal: Good range of motion in all major joints. No tenderness to palpation or major deformities noted.   Neurologic: Alert & oriented x 3, Normal motor function, Normal sensory function, No focal deficits noted. Reflexes are normal.  Psychiatric: Affect normal, Judgment normal, Mood normal. There is no suicidal ideation or patient reported hallucinations.     DIAGNOSTIC STUDIES  LABS  Labs Reviewed   HCG QUANTITATIVE - Abnormal; Notable for the following components:       Result Value    Bhcg 185.0 (*)     All other components within normal limits     COURSE & MEDICAL DECISION MAKING     ASSESSMENT, COURSE AND PLAN  Care Narrative:   12:24 PM - Patient was seen and evaluated at bedside. Patient presents to the ED for repeat HCG. Beta HCG returned which lowered from previous at 185. Suggest continued follow-up with Dr. Karla ashton on Wednesday for hopefully final blood draw if it is trending downward. I then informed the patient of my plan for discharge, which includes strict return precautions for any new or worsening symptoms. Patient understands and verbalizes agreement to plan of care. Patient is comfortable going home at this time.     Patient has pain-free and doing well and I think  methotrexate has been effective for this ectopic pregnancy    DISPOSITION AND DISCUSSIONS  I have discussed management of the patient with the following physicians and HERBIE's: None    Discussion of management with other [facility] or appropriate source(s): None     Barriers to care at this time, including but not limited to: None.     Decision tools and prescription drugs considered including, but not limited to: Pain Medications OTC medication should be adequate as the patient does not have any significant discomfort at this time .    The patient will return for new or worsening symptoms and is stable at the time of discharge.    DISPOSITION:  Patient will be discharged home in stable condition.    FOLLOW UP:  Michael Acosta M.D.  [address] [id number] [phone]    In 4 days  As scheduled    FINAL DIAGNOSIS  1. Ectopic pregnancy without intrauterine pregnancy, unspecified location       The note accurately reflects work and decisions made by me.  Alexandro Westbrook M.D.  9/29/2024  12:44 PM

## 2024-09-29 NOTE — DISCHARGE INSTRUCTIONS
Your beta-hCG has dropped from 230-185 as expected after methotrexate and suggest continued follow-up with Dr. Karla ashton on Wednesday for hopefully final blood draw if it is trending downward.

## 2024-09-29 NOTE — ED TRIAGE NOTES
Chief Complaint   Patient presents with    Other     Pt to triage, pt reports she has an ectopic pregnancy. Had lab work drawn 3 days ago and was told to come back for a redraw.      Explained to pt triage process, made pt aware to tell this RN/staff of any changes/concerns, pt verbalized understanding of process and instructions given. Pt to ER [facility].

## 2024-09-29 NOTE — ED NOTES
Discharge education provided by ERP. Discharge paperwork reviewed with patient understands need to complete additional follow-up lab. All questions answered. All belongings with patient. Patient ambulated to lobby unassisted with steady gait.